# Patient Record
Sex: FEMALE | Race: WHITE | NOT HISPANIC OR LATINO | Employment: OTHER | URBAN - NONMETROPOLITAN AREA
[De-identification: names, ages, dates, MRNs, and addresses within clinical notes are randomized per-mention and may not be internally consistent; named-entity substitution may affect disease eponyms.]

---

## 2021-10-07 ENCOUNTER — OFFICE VISIT (OUTPATIENT)
Dept: CARDIOLOGY | Facility: CLINIC | Age: 81
End: 2021-10-07

## 2021-10-07 VITALS
DIASTOLIC BLOOD PRESSURE: 80 MMHG | WEIGHT: 161 LBS | OXYGEN SATURATION: 98 % | TEMPERATURE: 98.4 F | HEART RATE: 73 BPM | SYSTOLIC BLOOD PRESSURE: 140 MMHG

## 2021-10-07 DIAGNOSIS — I49.1 PREMATURE ATRIAL BEATS: ICD-10-CM

## 2021-10-07 DIAGNOSIS — R03.0 BORDERLINE HYPERTENSION: Primary | ICD-10-CM

## 2021-10-07 PROBLEM — R53.82 CHRONIC FATIGUE: Status: ACTIVE | Noted: 2021-10-07

## 2021-10-07 PROBLEM — R53.82 CHRONIC FATIGUE: Status: RESOLVED | Noted: 2021-10-07 | Resolved: 2021-10-07

## 2021-10-07 PROBLEM — R94.31 ABNORMAL EKG: Status: ACTIVE | Noted: 2021-10-07

## 2021-10-07 PROCEDURE — 93000 ELECTROCARDIOGRAM COMPLETE: CPT | Performed by: INTERNAL MEDICINE

## 2021-10-07 PROCEDURE — 99204 OFFICE O/P NEW MOD 45 MIN: CPT | Performed by: INTERNAL MEDICINE

## 2021-10-07 NOTE — PROGRESS NOTES
Subjective   Chief Complaint   Patient presents with   • dr camacho thinks she has a leaky heart       History of Present Illness  Patient is 80 years old white female who is here for cardiac evaluation.     She does not follow-up with the doctors regularly she has recently saw her PCP for the first time..  Apparently blood pressure was mildly elevated and there was heart murmur.  Patient was sent here for cardiac evaluation.    She states that she is totally asymptomatic.  Denies any chest pain palpitations or shortness of breath.  Daughter states that she is physically very active and is probably in better shape than her    There is no prior history of rheumatic fever or heart murmur.  Patient does not know if she has hyperlipidemia or diabetes mellitus because she has not had any lab work done recently    Past Surgical History:   Procedure Laterality Date   • HERNIA REPAIR     • OVARY SURGERY     • TONSILLECTOMY       Family History   Problem Relation Age of Onset   • Pulmonary embolism Mother      Past Medical History:   Diagnosis Date   • Macular degeneration        Patient Active Problem List   Diagnosis   • Borderline hypertension   • Premature atrial beats   • Abnormal EKG         Social History     Tobacco Use   • Smoking status: Never Smoker   • Smokeless tobacco: Never Used   Substance Use Topics   • Alcohol use: Never   • Drug use: Never         The following portions of the patient's history were reviewed and updated as appropriate: allergies, current medications, past family history, past medical history, past social history, past surgical history and problem list.    No Known Allergies    No current outpatient medications on file.    Review of Systems   Constitutional: Negative.   HENT: Negative.  Negative for congestion.    Eyes: Negative.    Cardiovascular: Negative.  Negative for chest pain, cyanosis, dyspnea on exertion, irregular heartbeat, leg swelling, near-syncope, orthopnea, palpitations,  paroxysmal nocturnal dyspnea and syncope.   Respiratory: Negative.  Negative for shortness of breath.    Hematologic/Lymphatic: Negative.    Musculoskeletal: Negative.    Gastrointestinal: Negative.    Neurological: Negative.  Negative for headaches.        Objective      /80   Pulse 73   Temp 98.4 °F (36.9 °C)   Wt 73 kg (161 lb)   SpO2 98%     Pulmonary:      Effort: Pulmonary effort is normal.      Breath sounds: Normal breath sounds. No stridor. No wheezing. No rhonchi. No rales.   Cardiovascular:      PMI at left midclavicular line. Normal rate. Regular rhythm. Normal S1. Normal S2.      Murmurs: There is no murmur.      No gallop. No click. No rub.   Pulses:     Intact distal pulses.   Edema:     Peripheral edema absent.         Lab Review:    No lab work available    ECG 12 Lead    Date/Time: 10/7/2021 4:22 PM  Performed by: Alma Mejia MD  Authorized by: Alma Mejia MD   Rhythm: sinus rhythm  Ectopy: atrial premature contractions  Rate: normal  BPM: 70  QRS axis: normal  Other findings: non-specific ST-T wave changes, left ventricular hypertrophy and poor R wave progression    Clinical impression: abnormal EKG            I reviewed the patient's new clinical results.  I personally viewed and interpreted the patient's EKG/lab data        Assessment:   Diagnosis Plan   1. Borderline hypertension  Adult Transthoracic Echo Complete W/ Cont if Necessary Per Protocol   2. Premature atrial beats  Adult Transthoracic Echo Complete W/ Cont if Necessary Per Protocol    ECG 12 Lead          Plan:  Patient is 80 years old white female who is here for cardiac evaluation because of hypertension and heart murmur.    Patient is totally asymptomatic and does not seem to be interested in further work-up.  Her blood pressure today is normal  EKG shows occasional PACs left ventricular hypertrophy noted there is also poor R wave progression across the anterior chest leads but no clinical history  of prior myocardial infarction.    Patient does not know her lipid status or blood sugar.  She however feels that she does not have any hyperlipidemia or diabetes.    Further cardiac work-up is indicated  Patient was advised to have echocardiogram done  She also should have lipid panel and CMP checked  Patient is not very interested in work-up  Echocardiogram was scheduled and patient will decide if she wants to go through with that or not  Further work-up such as Holter monitor and stress test should also be considered for better evaluation    She was also advised to have her blood pressure checked closely and if it is elevated then she may need medications.  Patient feels that she has never taken any medications and is not really interested in starting medications at this time.    She was advised to watch her diet lengthy discussion about the diet and salt restriction.  Healthy lifestyle emphasized.  No follow-up was scheduled at this time.    Thank you for giving me the oppertunity to participate in your patient's cardiac care.    Sincerely,    RADHA Mejia M.D. FACP FAC     No follow-ups on file.

## 2024-11-14 ENCOUNTER — TRANSCRIBE ORDERS (OUTPATIENT)
Dept: ADMINISTRATIVE | Facility: HOSPITAL | Age: 84
End: 2024-11-14
Payer: MEDICARE

## 2024-11-14 DIAGNOSIS — N95.9 UNSPECIFIED MENOPAUSAL AND PERIMENOPAUSAL DISORDER: Primary | ICD-10-CM

## 2024-11-14 DIAGNOSIS — R01.1 CARDIAC MURMUR: Primary | ICD-10-CM

## 2024-11-22 ENCOUNTER — HOSPITAL ENCOUNTER (OUTPATIENT)
Dept: CARDIOLOGY | Facility: HOSPITAL | Age: 84
Discharge: HOME | End: 2024-11-22
Payer: MEDICARE

## 2024-11-22 ENCOUNTER — HOSPITAL ENCOUNTER (OUTPATIENT)
Dept: BONE DENSITY | Facility: HOSPITAL | Age: 84
Discharge: HOME | End: 2024-11-22
Payer: MEDICARE

## 2024-11-22 DIAGNOSIS — N95.9 UNSPECIFIED MENOPAUSAL AND PERIMENOPAUSAL DISORDER: ICD-10-CM

## 2024-11-22 DIAGNOSIS — R01.1 CARDIAC MURMUR: ICD-10-CM

## 2024-11-22 LAB
BH CV ECHO MEAS - ACS: 1.1 CM
BH CV ECHO MEAS - AI P1/2T: 828.6 MSEC
BH CV ECHO MEAS - AO MAX PG: 20.8 MMHG
BH CV ECHO MEAS - AO MEAN PG: 12.7 MMHG
BH CV ECHO MEAS - AO ROOT DIAM: 2.7 CM
BH CV ECHO MEAS - AO V2 MAX: 228 CM/SEC
BH CV ECHO MEAS - AO V2 VTI: 60.3 CM
BH CV ECHO MEAS - AVA(I,D): 1.56 CM2
BH CV ECHO MEAS - EDV(CUBED): 195.1 ML
BH CV ECHO MEAS - EDV(MOD-SP2): 70.8 ML
BH CV ECHO MEAS - EDV(MOD-SP4): 76.6 ML
BH CV ECHO MEAS - EF(MOD-BP): 64.1 %
BH CV ECHO MEAS - EF(MOD-SP2): 70.1 %
BH CV ECHO MEAS - EF(MOD-SP4): 58.9 %
BH CV ECHO MEAS - ESV(CUBED): 24.4 ML
BH CV ECHO MEAS - ESV(MOD-SP2): 21.2 ML
BH CV ECHO MEAS - ESV(MOD-SP4): 31.5 ML
BH CV ECHO MEAS - FS: 50 %
BH CV ECHO MEAS - IVS/LVPW: 0.88 CM
BH CV ECHO MEAS - IVSD: 0.7 CM
BH CV ECHO MEAS - LA DIMENSION: 5.2 CM
BH CV ECHO MEAS - LAT PEAK E' VEL: 5.4 CM/SEC
BH CV ECHO MEAS - LV MASS(C)D: 161.9 GRAMS
BH CV ECHO MEAS - LV MAX PG: 4.7 MMHG
BH CV ECHO MEAS - LV MEAN PG: 2 MMHG
BH CV ECHO MEAS - LV V1 MAX: 108 CM/SEC
BH CV ECHO MEAS - LV V1 VTI: 27.1 CM
BH CV ECHO MEAS - LVIDD: 5.8 CM
BH CV ECHO MEAS - LVIDS: 2.9 CM
BH CV ECHO MEAS - LVOT AREA: 3.5 CM2
BH CV ECHO MEAS - LVOT DIAM: 2.1 CM
BH CV ECHO MEAS - LVPWD: 0.8 CM
BH CV ECHO MEAS - MED PEAK E' VEL: 6.6 CM/SEC
BH CV ECHO MEAS - MV A MAX VEL: 115 CM/SEC
BH CV ECHO MEAS - MV DEC SLOPE: 405 CM/SEC2
BH CV ECHO MEAS - MV E MAX VEL: 104 CM/SEC
BH CV ECHO MEAS - MV E/A: 0.9
BH CV ECHO MEAS - MV P1/2T: 91.8 MSEC
BH CV ECHO MEAS - MVA(P1/2T): 2.4 CM2
BH CV ECHO MEAS - RAP SYSTOLE: 10 MMHG
BH CV ECHO MEAS - RVSP: 32.8 MMHG
BH CV ECHO MEAS - SV(LVOT): 93.9 ML
BH CV ECHO MEAS - SV(MOD-SP2): 49.6 ML
BH CV ECHO MEAS - SV(MOD-SP4): 45.1 ML
BH CV ECHO MEAS - TAPSE (>1.6): 2.38 CM
BH CV ECHO MEAS - TR MAX PG: 22.8 MMHG
BH CV ECHO MEAS - TR MAX VEL: 239 CM/SEC
BH CV ECHO MEASUREMENTS AVERAGE E/E' RATIO: 17.33

## 2024-11-22 PROCEDURE — 93306 TTE W/DOPPLER COMPLETE: CPT

## 2024-11-22 PROCEDURE — 77080 DXA BONE DENSITY AXIAL: CPT

## 2025-01-27 ENCOUNTER — OFFICE VISIT (OUTPATIENT)
Dept: CARDIOLOGY | Facility: CLINIC | Age: 85
End: 2025-01-27
Payer: MEDICARE

## 2025-01-27 VITALS
HEART RATE: 75 BPM | SYSTOLIC BLOOD PRESSURE: 155 MMHG | WEIGHT: 147 LBS | BODY MASS INDEX: 27.05 KG/M2 | HEIGHT: 62 IN | DIASTOLIC BLOOD PRESSURE: 85 MMHG

## 2025-01-27 DIAGNOSIS — I34.0 NONRHEUMATIC MITRAL VALVE REGURGITATION: Primary | ICD-10-CM

## 2025-01-27 DIAGNOSIS — I10 ESSENTIAL HYPERTENSION: ICD-10-CM

## 2025-01-27 DIAGNOSIS — I48.19 PERSISTENT ATRIAL FIBRILLATION: ICD-10-CM

## 2025-01-27 DIAGNOSIS — R07.2 PRECORDIAL PAIN: ICD-10-CM

## 2025-01-27 DIAGNOSIS — I35.1 NONRHEUMATIC AORTIC VALVE INSUFFICIENCY: ICD-10-CM

## 2025-01-27 PROCEDURE — 99204 OFFICE O/P NEW MOD 45 MIN: CPT | Performed by: INTERNAL MEDICINE

## 2025-01-27 PROCEDURE — 93000 ELECTROCARDIOGRAM COMPLETE: CPT | Performed by: INTERNAL MEDICINE

## 2025-01-27 RX ORDER — DIPHENOXYLATE HYDROCHLORIDE AND ATROPINE SULFATE 2.5; .025 MG/1; MG/1
TABLET ORAL DAILY
COMMUNITY

## 2025-01-27 RX ORDER — LOSARTAN POTASSIUM 25 MG/1
25 TABLET ORAL DAILY
COMMUNITY

## 2025-01-27 NOTE — PROGRESS NOTES
Janie Reid PA  Suellen Matamoros  1940 01/27/2025    Patient Active Problem List   Diagnosis    Borderline hypertension    Premature atrial beats    Abnormal EKG       Dear  Janie Reid PA-C:    Subjective     Suellen Matamoros is a 84 y.o. female with the problems as listed above, presents    Chief complaint: Recent finding of moderate to severe mitral regurgitation    History of Present Illness: Ms. Suellen Villaseñor is a pleasant 84-year-old  female with a history of hypertension and type 2 diabetes mellitus, was recently noted to have moderate to severe mitral regurgitation and mild aortic incompetence and mild aortic stenosis on the echo Doppler study performed on 11/22/2024.  She is now referred to us for the same.      On further questioning she does complain of some intermittent shortness of breath with moderate exertion.  She denies any PND, orthopnea.  He.  She has some left-sided chest pains that seem to occur at random and resolve spontaneously.  They are of mild intensity.  She does check her blood pressure at home but not sure what it runs at.  She is accompanied by her son today.  Patient's been exposed to secondhand smoking all her life until 2004.    Complete Transthoracic Echocardiogram with Complete Doppler and Color Flow    Accession Number: [id number]   Date of Study: 11/22/24   Ordering Provider: Janie Reid PA   Clinical Indications: Murmur or Click        [facility]  Performing Staff   Cardiology: Manuel Cowart MD    Tech: Dana Harvey           I Show images for Adult Transthoracic Echo Complete W/ Cont if Necessary Per Protocol   Clinical Indication    Murmur or Click   Dx: Cardiac murmur [R01.1 (ICD-10-CM)]     Interpretation Summary     Left ventricular systolic function is normal. Left ventricular ejection fraction appears to be 66 - 70%.    The left ventricular cavity is mildly dilated.    Left ventricular diastolic function is consistent  with (grade Ia w/high LAP) impaired relaxation.    The left atrial cavity is moderately dilated.    The right atrial cavity is mildly  dilated.    Mild aortic valve stenosis is present.    Moderate to severe mitral valve regurgitation is present.    Estimated right ventricular systolic pressure from tricuspid regurgitation is normal (<35 mmHg).    Echogenic consistency of myocardium consistent with possible infiltrative process, consider cardiac amyloidosis.     I looked at the echo myself and do not see any echocardiographic signs of amyloidosis.    No Known Allergies:    Current Outpatient Medications:     Ascorbic Acid (VITAMIN C PO), Take  by mouth., Disp: , Rfl:     Barberry-Oreg Grape-Goldenseal (BERBERINE COMPLEX PO), Take  by mouth., Disp: , Rfl:     Calcium Carb-Cholecalciferol (CALCIUM 500+D PO), Take  by mouth., Disp: , Rfl:     Cholecalciferol (VITAMIN D-3 PO), Take  by mouth., Disp: , Rfl:     losartan (COZAAR) 25 MG tablet, Take 1 tablet by mouth Daily., Disp: , Rfl:     metFORMIN (GLUCOPHAGE) 500 MG tablet, Take 1 tablet by mouth 2 (Two) Times a Day With Meals., Disp: , Rfl:     Multiple Vitamins-Minerals (ICAPS AREDS 2 PO), Take  by mouth., Disp: , Rfl:     Multiple Vitamins-Minerals (ZINC PO), Take  by mouth., Disp: , Rfl:     multivitamin (MULTI VITAMIN PO), Take  by mouth Daily., Disp: , Rfl:     multivitamin with minerals (VISION FORMULA PO), Take 1 tablet by mouth Daily., Disp: , Rfl:     Omega-3 Fatty Acids (OMEGA 3 PO), Take  by mouth., Disp: , Rfl:     apixaban (ELIQUIS) 2.5 MG tablet tablet, Take 1 tablet by mouth 2 (Two) Times a Day., Disp: 60 tablet, Rfl: 2    Past Medical History:   Diagnosis Date    Macular degeneration      Past Surgical History:   Procedure Laterality Date    HERNIA REPAIR      OVARY SURGERY      TONSILLECTOMY       Family History   Problem Relation Age of Onset    Pulmonary embolism Mother      Social History     Tobacco Use    Smoking status: Never    Smokeless  "tobacco: Never   Vaping Use    Vaping status: Never Used   Substance Use Topics    Alcohol use: Never    Drug use: Never     Review of Systems   Constitutional: Negative for chills, fever, malaise/fatigue, weight gain and weight loss.   HENT:  Negative for congestion and nosebleeds.    Cardiovascular:  Negative for chest pain, irregular heartbeat, leg swelling and orthopnea.   Respiratory:  Negative for cough, hemoptysis and shortness of breath.    Endocrine: Positive for polydipsia.   Musculoskeletal:  Positive for back pain, joint pain, joint swelling and stiffness.   Gastrointestinal:  Positive for change in bowel habit. Negative for abdominal pain, hematemesis, melena and vomiting.   Genitourinary:  Positive for bladder incontinence and frequency. Negative for dysuria and hematuria.   Neurological:  Positive for numbness. Negative for focal weakness, headaches, light-headedness and weakness.     Objective   Blood pressure 155/85, pulse 75, height 157.5 cm (62\"), weight 66.7 kg (147 lb).  Body mass index is 26.89 kg/m².    Vitals reviewed.   Constitutional:       Appearance: Well-developed.   Eyes:      Conjunctiva/sclera: Conjunctivae normal.   HENT:      Head: Normocephalic.   Neck:      Thyroid: No thyromegaly.      Vascular: No JVD.      Trachea: No tracheal deviation.   Pulmonary:      Effort: No respiratory distress.      Breath sounds: Normal breath sounds. No wheezing. No rales.   Cardiovascular:      PMI at left midclavicular line. Normal rate. Irregularly irregular rhythm. Normal S1. Normal S2.       Murmurs: There is a grade 3/6 high frequency blowing holosystolic murmur at the apex. There is also a grade 2/6 harsh midsystolic murmur at the URSB, radiating to the neck. There is a grade 2/4 high frequency blowing decrescendo, early diastolic murmur at the URSB, radiating to the apex.      No gallop.  No click. No rub.   Pulses:     Intact distal pulses.   Edema:     Peripheral edema absent.   Abdominal: "      General: Bowel sounds are normal.      Palpations: Abdomen is soft. There is no abdominal mass.      Tenderness: There is no abdominal tenderness.   Musculoskeletal:      Cervical back: Normal range of motion and neck supple. Skin:     General: Skin is warm and dry.   Neurological:      Mental Status: Alert and oriented to person, place, and time.      Cranial Nerves: No cranial nerve deficit.         ECG 12 Lead    Date/Time: 1/27/2025 10:08 AM  Performed by: Sam Asif MD    Authorized by: Sam Asif MD  Comparison: compared with previous ECG from 10/17/2021  Comparison to previous ECG: Is now in atrial fibrillation as compared to the previous EKG.  Rhythm: atrial fibrillation  Conduction: conduction normal  Other findings: non-specific ST-T wave changes          Assessment & Plan    Diagnosis Plan   1. Nonrheumatic mitral valve regurgitation (moderate to severe)  Ambulatory Referral to Cardiothoracic Surgery      2. Precordial pain  ECG 12 Lead    Stress Test With Myocardial Perfusion (1 Day)      3. Persistent atrial fibrillation with a [id number] score of 5        4. Nonrheumatic aortic valve insufficiency (mild)        5. Essential hypertension            Recommendations  Orders Placed This Encounter   Procedures    Ambulatory Referral to Cardiothoracic Surgery    Stress Test With Myocardial Perfusion (1 Day)    ECG 12 Lead        I had an extensive discussion with the Ms. Villaseñor and her son about her echocardiographic findings including the mitral regurgitation and aortic incompetence and a implications for the future with progression of the valvular regurgitations that at some point might require valve replacement.  I have also discussed the importance of having good blood pressure control to hopefully slow the progression of the valvular regurgitations.  I have recommended regular recording of blood pressures at home and taking to the provider's visit to adjust the dose of blood pressure  medications to keep the systolic blood pressure less than 130 or at least less than 140 systolic and less than or equal to 85 diastolic.  Patient and her son expressed understanding.  With regards to mitral regurgitation, I will refer her to cardiothoracic surgery to  give opinion regarding the timing for mitral valve repair/replacement  With regards to atrial fibrillation with a high [id number] score of 5, I have recommended to initiate oral anticoagulation for stroke prevention.  I also discussed about the option of electrical cardioversion to hopefully help with the symptoms of shortness of breath that she is complaining now.  Patient prefers to wait on the cardioversion for now.  We also discussed about evaluating for any underlying myocardial ischemia with a Lexiscan sestamibi study.  Will start her on low-dose Eliquis for now at 2.5 mg p.o. twice daily pending checking a BMP for kidney function and if the kidney function is normal then we will increase the dose to 5 mg twice a day.  I have alerted her to watch for any bleeding especially in the bowels and if she develops any blood in the stools or black stools then she has to stop the blood thinner.  Patient expressed understanding.  Will try to get her last BMP from her PCP as patient states that she had a recent blood test in December 2024.    Return in about 4 weeks (around 2/24/2025).    As always, Janie  I appreciate very much the opportunity to participate in the cardiovascular care of your patients. Please do not hesitate to call me with any questions with regards to Suellen Matamoros's evaluation and management.     With Best Regards,        aSm Asif MD, [facility]    Please note that portions of this note were completed with a voice recognition program.

## 2025-01-27 NOTE — LETTER
January 27, 2025     Janie Reid PA-C  [address] [place] [address]    Patient: Suellen Matamoros   YOB: 1940   Date of Visit: 1/27/2025     Dear Janie Reid PA-C:       Thank you for referring Suellen Matamoros to me for evaluation. Below are the relevant portions of my assessment and plan of care.    If you have questions, please do not hesitate to call me. I look forward to following Suellen along with you.         Sincerely,        Sam Asif MD        CC: Suellen Matamoros    Sam Asif MD  01/27/25 1736  Addendum  Janie Reid PA Sandchuckie Yusufnakul  1940 01/27/2025    Patient Active Problem List   Diagnosis   • Borderline hypertension   • Premature atrial beats   • Abnormal EKG       Dear  Janie Reid PA-C:    Subjective    Suellen Matamoros is a 84 y.o. female with the problems as listed above, presents    Chief complaint: Recent finding of moderate to severe mitral regurgitation    History of Present Illness: Ms. Suellen Villaseñor is a pleasant 84-year-old  female with a history of hypertension and type 2 diabetes mellitus, was recently noted to have moderate to severe mitral regurgitation and mild aortic incompetence and mild aortic stenosis on the echo Doppler study performed on 11/22/2024.  She is now referred to us for the same.      On further questioning she does complain of some intermittent shortness of breath with moderate exertion.  She denies any PND, orthopnea.  He.  She has some left-sided chest pains that seem to occur at random and resolve spontaneously.  They are of mild intensity.  She does check her blood pressure at home but not sure what it runs at.  She is accompanied by her son today.  Patient's been exposed to secondhand smoking all her life until 2004.    Complete Transthoracic Echocardiogram with Complete Doppler and Color Flow    Accession Number: [id number]   Date of Study: 11/22/24   Ordering Provider: Franklin  MARY Bailey   Clinical Indications: Murmur or Click        [facility]  Performing Staff   Cardiology: Manuel Cowart MD    Tech: Dana Harvey           I Show images for Adult Transthoracic Echo Complete W/ Cont if Necessary Per Protocol   Clinical Indication    Murmur or Click   Dx: Cardiac murmur [R01.1 (ICD-10-CM)]     Interpretation Summary   •  Left ventricular systolic function is normal. Left ventricular ejection fraction appears to be 66 - 70%.  •  The left ventricular cavity is mildly dilated.  •  Left ventricular diastolic function is consistent with (grade Ia w/high LAP) impaired relaxation.  •  The left atrial cavity is moderately dilated.  •  The right atrial cavity is mildly  dilated.  •  Mild aortic valve stenosis is present.  •  Moderate to severe mitral valve regurgitation is present.  •  Estimated right ventricular systolic pressure from tricuspid regurgitation is normal (<35 mmHg).  •  Echogenic consistency of myocardium consistent with possible infiltrative process, consider cardiac amyloidosis.     I looked at the echo myself and do not see any echocardiographic signs of amyloidosis.    No Known Allergies:    Current Outpatient Medications:   •  Ascorbic Acid (VITAMIN C PO), Take  by mouth., Disp: , Rfl:   •  Barberry-Oreg Grape-Goldenseal (BERBERINE COMPLEX PO), Take  by mouth., Disp: , Rfl:   •  Calcium Carb-Cholecalciferol (CALCIUM 500+D PO), Take  by mouth., Disp: , Rfl:   •  Cholecalciferol (VITAMIN D-3 PO), Take  by mouth., Disp: , Rfl:   •  losartan (COZAAR) 25 MG tablet, Take 1 tablet by mouth Daily., Disp: , Rfl:   •  metFORMIN (GLUCOPHAGE) 500 MG tablet, Take 1 tablet by mouth 2 (Two) Times a Day With Meals., Disp: , Rfl:   •  Multiple Vitamins-Minerals (ICAPS AREDS 2 PO), Take  by mouth., Disp: , Rfl:   •  Multiple Vitamins-Minerals (ZINC PO), Take  by mouth., Disp: , Rfl:   •  multivitamin (MULTI VITAMIN PO), Take  by mouth Daily., Disp: , Rfl:   •  multivitamin with  "minerals (VISION FORMULA PO), Take 1 tablet by mouth Daily., Disp: , Rfl:   •  Omega-3 Fatty Acids (OMEGA 3 PO), Take  by mouth., Disp: , Rfl:   •  apixaban (ELIQUIS) 2.5 MG tablet tablet, Take 1 tablet by mouth 2 (Two) Times a Day., Disp: 60 tablet, Rfl: 2    Past Medical History:   Diagnosis Date   • Macular degeneration      Past Surgical History:   Procedure Laterality Date   • HERNIA REPAIR     • OVARY SURGERY     • TONSILLECTOMY       Family History   Problem Relation Age of Onset   • Pulmonary embolism Mother      Social History     Tobacco Use   • Smoking status: Never   • Smokeless tobacco: Never   Vaping Use   • Vaping status: Never Used   Substance Use Topics   • Alcohol use: Never   • Drug use: Never     Review of Systems   Constitutional: Negative for chills, fever, malaise/fatigue, weight gain and weight loss.   HENT:  Negative for congestion and nosebleeds.    Cardiovascular:  Negative for chest pain, irregular heartbeat, leg swelling and orthopnea.   Respiratory:  Negative for cough, hemoptysis and shortness of breath.    Endocrine: Positive for polydipsia.   Musculoskeletal:  Positive for back pain, joint pain, joint swelling and stiffness.   Gastrointestinal:  Positive for change in bowel habit. Negative for abdominal pain, hematemesis, melena and vomiting.   Genitourinary:  Positive for bladder incontinence and frequency. Negative for dysuria and hematuria.   Neurological:  Positive for numbness. Negative for focal weakness, headaches, light-headedness and weakness.     Objective  Blood pressure 155/85, pulse 75, height 157.5 cm (62\"), weight 66.7 kg (147 lb).  Body mass index is 26.89 kg/m².    Vitals reviewed.   Constitutional:       Appearance: Well-developed.   Eyes:      Conjunctiva/sclera: Conjunctivae normal.   HENT:      Head: Normocephalic.   Neck:      Thyroid: No thyromegaly.      Vascular: No JVD.      Trachea: No tracheal deviation.   Pulmonary:      Effort: No respiratory distress. "      Breath sounds: Normal breath sounds. No wheezing. No rales.   Cardiovascular:      PMI at left midclavicular line. Normal rate. Irregularly irregular rhythm. Normal S1. Normal S2.       Murmurs: There is a grade 3/6 high frequency blowing holosystolic murmur at the apex. There is also a grade 2/6 harsh midsystolic murmur at the URSB, radiating to the neck. There is a grade 2/4 high frequency blowing decrescendo, early diastolic murmur at the URSB, radiating to the apex.      No gallop.  No click. No rub.   Pulses:     Intact distal pulses.   Edema:     Peripheral edema absent.   Abdominal:      General: Bowel sounds are normal.      Palpations: Abdomen is soft. There is no abdominal mass.      Tenderness: There is no abdominal tenderness.   Musculoskeletal:      Cervical back: Normal range of motion and neck supple. Skin:     General: Skin is warm and dry.   Neurological:      Mental Status: Alert and oriented to person, place, and time.      Cranial Nerves: No cranial nerve deficit.         ECG 12 Lead    Date/Time: 1/27/2025 10:08 AM  Performed by: Sam Asif MD    Authorized by: Sam Asif MD  Comparison: compared with previous ECG from 10/17/2021  Comparison to previous ECG: Is now in atrial fibrillation as compared to the previous EKG.  Rhythm: atrial fibrillation  Conduction: conduction normal  Other findings: non-specific ST-T wave changes          Assessment & Plan   Diagnosis Plan   1. Nonrheumatic mitral valve regurgitation (moderate to severe)  Ambulatory Referral to Cardiothoracic Surgery      2. Precordial pain  ECG 12 Lead    Stress Test With Myocardial Perfusion (1 Day)      3. Persistent atrial fibrillation with a [id number]-VNSm score of 5        4. Nonrheumatic aortic valve insufficiency (mild)        5. Essential hypertension            Recommendations  Orders Placed This Encounter   Procedures   • Ambulatory Referral to Cardiothoracic Surgery   • Stress Test With Myocardial  Perfusion (1 Day)   • ECG 12 Lead        I had an extensive discussion with the Ms. Villaseñor and her son about her echocardiographic findings including the mitral regurgitation and aortic incompetence and a implications for the future with progression of the valvular regurgitations that at some point might require valve replacement.  I have also discussed the importance of having good blood pressure control to hopefully slow the progression of the valvular regurgitations.  I have recommended regular recording of blood pressures at home and taking to the provider's visit to adjust the dose of blood pressure medications to keep the systolic blood pressure less than 130 or at least less than 140 systolic and less than or equal to 85 diastolic.  Patient and her son expressed understanding.  With regards to mitral regurgitation, I will refer her to cardiothoracic surgery to  give opinion regarding the timing for mitral valve repair/replacement  With regards to atrial fibrillation with a high PPQ6RL8-LASn score of 5, I have recommended to initiate oral anticoagulation for stroke prevention.  I also discussed about the option of electrical cardioversion to hopefully help with the symptoms of shortness of breath that she is complaining now.  Patient prefers to wait on the cardioversion for now.  We also discussed about evaluating for any underlying myocardial ischemia with a Lexiscan sestamibi study.  Will start her on low-dose Eliquis for now at 2.5 mg p.o. twice daily pending checking a BMP for kidney function and if the kidney function is normal then we will increase the dose to 5 mg twice a day.  I have alerted her to watch for any bleeding especially in the bowels and if she develops any blood in the stools or black stools then she has to stop the blood thinner.  Patient expressed understanding.  Will try to get her last BMP from her PCP as patient states that she had a recent blood test in December 2024.    Return in  about 4 weeks (around 2/24/2025).    As always, Janie  I appreciate very much the opportunity to participate in the cardiovascular care of your patients. Please do not hesitate to call me with any questions with regards to Suellen Matamoros's evaluation and management.     With Best Regards,        Sam Asif MD, [facility]    Please note that portions of this note were completed with a voice recognition program.

## 2025-02-06 ENCOUNTER — DOCUMENTATION (OUTPATIENT)
Dept: CARDIOLOGY | Facility: HOSPITAL | Age: 85
End: 2025-02-06
Payer: MEDICARE

## 2025-02-06 NOTE — PROGRESS NOTES
Referral received from Dr. Asif for moderate to severe mitral valve regurgitation. Called patient to discuss MR and MitraClip. She is unable to transport from her residence in [place] to [place] Requested that her MR be evaluated by [facility] due to location and her son can assist with transportation.     Called referring MD office to relay information and request that CT surgery consult be sent to [facility] per patient's request.     Mailed MitraClip folder to patient, my contact information is included if I can be of any assistance.

## 2025-02-10 ENCOUNTER — TELEPHONE (OUTPATIENT)
Dept: CARDIOLOGY | Facility: CLINIC | Age: 85
End: 2025-02-10
Payer: MEDICARE

## 2025-02-10 NOTE — TELEPHONE ENCOUNTER
Caller: Suellen Matamoros    Relationship: Self    Best call back number: [phone]    What is the best time to reach you: ANY    Who are you requesting to speak with (clinical staff, provider,  specific staff member): CLINICAL    What was the call regarding: PT IS WANTING TO KNOW IF IT IS OK TO HAVE HER STRESS TEST WITH HER AFIB

## 2025-02-11 NOTE — TELEPHONE ENCOUNTER
Called pt and advised her that  was aware of that when he ordered it and it would be okay to have it done. She expressed understanding.

## 2025-02-21 ENCOUNTER — HOSPITAL ENCOUNTER (OUTPATIENT)
Dept: NUCLEAR MEDICINE | Facility: HOSPITAL | Age: 85
Discharge: HOME | End: 2025-02-21
Payer: MEDICARE

## 2025-02-21 ENCOUNTER — HOSPITAL ENCOUNTER (EMERGENCY)
Facility: HOSPITAL | Age: 85
Discharge: HOME | End: 2025-02-21
Payer: MEDICARE

## 2025-02-21 ENCOUNTER — HOSPITAL ENCOUNTER (OUTPATIENT)
Dept: CARDIOLOGY | Facility: HOSPITAL | Age: 85
Discharge: HOME | End: 2025-02-21
Payer: MEDICARE

## 2025-02-21 ENCOUNTER — APPOINTMENT (OUTPATIENT)
Dept: CT IMAGING | Facility: HOSPITAL | Age: 85
End: 2025-02-21
Payer: MEDICARE

## 2025-02-21 VITALS
SYSTOLIC BLOOD PRESSURE: 155 MMHG | HEART RATE: 117 BPM | RESPIRATION RATE: 14 BRPM | HEIGHT: 62 IN | OXYGEN SATURATION: 96 % | TEMPERATURE: 98.3 F | BODY MASS INDEX: 27.23 KG/M2 | DIASTOLIC BLOOD PRESSURE: 113 MMHG | WEIGHT: 148 LBS

## 2025-02-21 DIAGNOSIS — R07.2 PRECORDIAL PAIN: ICD-10-CM

## 2025-02-21 DIAGNOSIS — S00.212A ABRASION OF LEFT EYEBROW, INITIAL ENCOUNTER: ICD-10-CM

## 2025-02-21 DIAGNOSIS — S00.83XA CONTUSION OF FOREHEAD, INITIAL ENCOUNTER: Primary | ICD-10-CM

## 2025-02-21 PROCEDURE — A9500 TC99M SESTAMIBI: HCPCS | Performed by: INTERNAL MEDICINE

## 2025-02-21 PROCEDURE — 25010000002 REGADENOSON 0.4 MG/5ML SOLUTION: Performed by: INTERNAL MEDICINE

## 2025-02-21 PROCEDURE — 70450 CT HEAD/BRAIN W/O DYE: CPT

## 2025-02-21 PROCEDURE — 34310000005 TECHNETIUM SESTAMIBI: Performed by: INTERNAL MEDICINE

## 2025-02-21 PROCEDURE — 93017 CV STRESS TEST TRACING ONLY: CPT

## 2025-02-21 PROCEDURE — 99284 EMERGENCY DEPT VISIT MOD MDM: CPT

## 2025-02-21 PROCEDURE — 78452 HT MUSCLE IMAGE SPECT MULT: CPT

## 2025-02-21 RX ORDER — REGADENOSON 0.08 MG/ML
0.4 INJECTION, SOLUTION INTRAVENOUS
Status: COMPLETED | OUTPATIENT
Start: 2025-02-21 | End: 2025-02-21

## 2025-02-21 RX ORDER — SODIUM CHLORIDE FOR INHALATION 0.9 %
VIAL, NEBULIZER (ML) INHALATION
Status: DISCONTINUED
Start: 2025-02-21 | End: 2025-02-22 | Stop reason: WASHOUT

## 2025-02-21 RX ADMIN — REGADENOSON 0.4 MG: 0.08 INJECTION, SOLUTION INTRAVENOUS at 11:57

## 2025-02-21 RX ADMIN — TECHNETIUM TC 99M SESTAMIBI 1 DOSE: 1 INJECTION INTRAVENOUS at 12:25

## 2025-02-21 RX ADMIN — TECHNETIUM TC 99M SESTAMIBI 1 DOSE: 1 INJECTION INTRAVENOUS at 10:51

## 2025-02-21 NOTE — ED NOTES
MEDICAL SCREENING:    Reason for Visit: head injury      Patient initially seen in triage.  The patient was advised further evaluation and diagnostic testing will be needed, some of the treatment and testing will be initiated in the lobby in order to begin the process.  The patient will be returned to the waiting area for the time being and possibly be re-assessed by a subsequent ED provider.  The patient will be brought back to the treatment area in as timely manner as possible.       Yulisa Bernal PA  02/21/25 2632

## 2025-02-21 NOTE — ED PROVIDER NOTES
Subjective   History of Present Illness  84-year-old female patient presents to the emergency room today after she took a fall in the parking lot.  Patient states that she tripped over a curb and landed face first.  She did hit her head.  She does take a blood thinner.  There was no loss of consciousness.  Patient has a abrasion to her left eyebrow.  Patient denies any other injuries or concerns.    History provided by:  Patient   used: No        Review of Systems   Constitutional: Negative.    Eyes: Negative.    Respiratory: Negative.     Cardiovascular: Negative.    Gastrointestinal: Negative.    Endocrine: Negative.    Genitourinary: Negative.    Musculoskeletal: Negative.    Skin:  Positive for wound.   Allergic/Immunologic: Negative.    Neurological:  Positive for headaches.   Hematological: Negative.    Psychiatric/Behavioral: Negative.     All other systems reviewed and are negative.      Past Medical History:   Diagnosis Date    Macular degeneration        No Known Allergies    Past Surgical History:   Procedure Laterality Date    HERNIA REPAIR      OVARY SURGERY      TONSILLECTOMY         Family History   Problem Relation Age of Onset    Pulmonary embolism Mother        Social History     Socioeconomic History    Marital status:    Tobacco Use    Smoking status: Never    Smokeless tobacco: Never   Vaping Use    Vaping status: Never Used   Substance and Sexual Activity    Alcohol use: Never    Drug use: Never    Sexual activity: Defer           Objective   Physical Exam  Vitals and nursing note reviewed.   Constitutional:       Appearance: Normal appearance. She is normal weight.   HENT:      Head: Normocephalic.      Comments: Abrasion above left eyebrow.  No bleeding. No closure warranted.  There is bruising and swelling.      Right Ear: External ear normal.      Left Ear: External ear normal.      Nose: Nose normal.      Mouth/Throat:      Mouth: Mucous membranes are moist.       Pharynx: Oropharynx is clear.   Eyes:      Extraocular Movements: Extraocular movements intact.      Conjunctiva/sclera: Conjunctivae normal.      Pupils: Pupils are equal, round, and reactive to light.   Cardiovascular:      Rate and Rhythm: Normal rate and regular rhythm.      Pulses: Normal pulses.      Heart sounds: Normal heart sounds.   Pulmonary:      Effort: Pulmonary effort is normal.      Breath sounds: Normal breath sounds.   Abdominal:      General: Abdomen is flat. Bowel sounds are normal.      Palpations: Abdomen is soft.   Musculoskeletal:         General: Normal range of motion.      Cervical back: Normal range of motion and neck supple.      Comments: Full range of motion of all extremities.  No swelling or deformity.  No other signs of trauma.  She is neurovascularly intact.   Skin:     General: Skin is warm and dry.      Capillary Refill: Capillary refill takes less than 2 seconds.   Neurological:      General: No focal deficit present.      Mental Status: She is alert and oriented to person, place, and time. Mental status is at baseline.      Comments: GCS 15  NIH 0   Psychiatric:         Mood and Affect: Mood normal.         Behavior: Behavior normal.         Thought Content: Thought content normal.         Judgment: Judgment normal.         Procedures           ED Course  ED Course as of 02/21/25 1505   Fri Feb 21, 2025   1456 CT Head Without Contrast [ML]      ED Course User Index  [ML] Yulisa Bernal PA                                      CT Head Without Contrast    Result Date: 2/21/2025  1.  No CT evidence of acute intracranial abnormality. 2.  Minimal left supraorbital region soft tissue swelling.  This report was finalized on 2/21/2025 2:49 PM by Dr. Josiah Traore MD.                      Medical Decision Making  84-year-old female patient presents to the emergency room today after she took a fall in the parking lot.  Patient states that she tripped over a curb and landed face  first.  She did hit her head.  She does take a blood thinner.  There was no loss of consciousness.  Patient has a abrasion to her left eyebrow.  Patient denies any other injuries or concerns.  ED stay has been uncomplicated uneventful.  Imaging is negative for any acute findings.  Patient's wound was cleaned and dressed.  Recommended close follow-up with primary care.  Discussed head injury precautions.    Problems Addressed:  Abrasion of left eyebrow, initial encounter: complicated acute illness or injury  Contusion of forehead, initial encounter: complicated acute illness or injury    Amount and/or Complexity of Data Reviewed  Radiology: ordered. Decision-making details documented in ED Course.        Final diagnoses:   Contusion of forehead, initial encounter   Abrasion of left eyebrow, initial encounter       ED Disposition  ED Disposition       ED Disposition   Discharge    Condition   Stable    Comment   --               Janie Reid PA  [address] [place] [address]  [phone]    Schedule an appointment as soon as possible for a visit in 3 days           Medication List      No changes were made to your prescriptions during this visit.            Yulisa Bernal PA  02/21/25 9690

## 2025-02-24 LAB
BH CV NUCLEAR PRIOR STUDY: 3
BH CV REST NUCLEAR ISOTOPE DOSE: 10.2 MCI
BH CV STRESS BP STAGE 1: NORMAL
BH CV STRESS COMMENTS STAGE 1: NORMAL
BH CV STRESS DOSE REGADENOSON STAGE 1: 0.4
BH CV STRESS DURATION MIN STAGE 1: 0
BH CV STRESS DURATION SEC STAGE 1: 10
BH CV STRESS HR STAGE 1: 151
BH CV STRESS NUCLEAR ISOTOPE DOSE: 29.7 MCI
BH CV STRESS PROTOCOL 1: NORMAL
BH CV STRESS RECOVERY BP: NORMAL MMHG
BH CV STRESS RECOVERY HR: 113 BPM
BH CV STRESS STAGE 1: 1
MAXIMAL PREDICTED HEART RATE: 137 BPM
PERCENT MAX PREDICTED HR: 110.22 %
SPECT HRT GATED+EF W RNC IV: 42 %
STRESS BASELINE BP: NORMAL MMHG
STRESS BASELINE HR: 102 BPM
STRESS PERCENT HR: 130 %
STRESS POST PEAK BP: NORMAL MMHG
STRESS POST PEAK HR: 151 BPM
STRESS TARGET HR: 116 BPM

## 2025-02-25 ENCOUNTER — OFFICE VISIT (OUTPATIENT)
Dept: CARDIOLOGY | Facility: CLINIC | Age: 85
End: 2025-02-25
Payer: MEDICARE

## 2025-02-25 ENCOUNTER — TELEPHONE (OUTPATIENT)
Dept: CARDIOLOGY | Facility: CLINIC | Age: 85
End: 2025-02-25

## 2025-02-25 VITALS
OXYGEN SATURATION: 95 % | HEART RATE: 75 BPM | DIASTOLIC BLOOD PRESSURE: 78 MMHG | SYSTOLIC BLOOD PRESSURE: 122 MMHG | HEIGHT: 62 IN | RESPIRATION RATE: 16 BRPM | WEIGHT: 151.4 LBS | BODY MASS INDEX: 27.86 KG/M2

## 2025-02-25 DIAGNOSIS — I34.0 NONRHEUMATIC MITRAL VALVE REGURGITATION: Primary | ICD-10-CM

## 2025-02-25 DIAGNOSIS — I48.19 PERSISTENT ATRIAL FIBRILLATION: ICD-10-CM

## 2025-02-25 NOTE — PROGRESS NOTES
Janie Reid PA  Suellen Matamoros  1940 02/25/2025    Patient Active Problem List   Diagnosis    Borderline hypertension    Premature atrial beats    Abnormal EKG    Persistent atrial fibrillation with a HIH5EX3-TLZm score of 5       Dear Janie Reid PA:    Subjective     History of Present Illness:    Chief Complaint   Patient presents with    Follow-up     Stress findings    Med Management     verbal       Suellen Matamoros is a pleasant 84 y.o. female with a past medical history significant for essential hypertension, diabetes mellitus, moderate to severe mitral valve regurgitation, mild aortic incompetence.  She comes in today for cardiology follow-up.    Suellen comes in today for follow-up at last visit she was referred to [facility] for consideration of MitraClip however patient preferred to try to go to [facility] for this.  Clinically she still reports significant dyspnea and weakness just doing normal activities a day living she reports inability to even walk from one point in her house to another without having to stop and rest.  She does report some episodes where she feels her heart beating irregular but not continuously.  She denies any overt anginal symptoms such as chest pressure or chest pain.  She has been taking Eliquis but at low-dose of 2.5 mg.    No Known Allergies:      Current Outpatient Medications:     apixaban (ELIQUIS) 5 MG tablet tablet, Take 1 tablet by mouth 2 (Two) Times a Day., Disp: 60 tablet, Rfl: 2    Ascorbic Acid (VITAMIN C PO), Take  by mouth., Disp: , Rfl:     Barberry-Oreg Grape-Goldenseal (BERBERINE COMPLEX PO), Take  by mouth., Disp: , Rfl:     Calcium Carb-Cholecalciferol (CALCIUM 500+D PO), Take  by mouth., Disp: , Rfl:     Cholecalciferol (VITAMIN D-3 PO), Take  by mouth., Disp: , Rfl:     losartan (COZAAR) 25 MG tablet, Take 1 tablet by mouth Daily., Disp: , Rfl:     metFORMIN (GLUCOPHAGE) 500 MG  "tablet, Take 1 tablet by mouth 2 (Two) Times a Day With Meals., Disp: , Rfl:     Multiple Vitamins-Minerals (ICAPS AREDS 2 PO), Take  by mouth., Disp: , Rfl:     Multiple Vitamins-Minerals (ZINC PO), Take  by mouth., Disp: , Rfl:     multivitamin (MULTI VITAMIN PO), Take  by mouth Daily., Disp: , Rfl:     multivitamin with minerals (VISION FORMULA PO), Take 1 tablet by mouth Daily., Disp: , Rfl:     Omega-3 Fatty Acids (OMEGA 3 PO), Take  by mouth., Disp: , Rfl:     The following portions of the patient's history were reviewed and updated as appropriate: allergies, current medications, past family history, past medical history, past social history, past surgical history and problem list.    Social History     Tobacco Use    Smoking status: Never    Smokeless tobacco: Never   Vaping Use    Vaping status: Never Used   Substance Use Topics    Alcohol use: Never    Drug use: Never         Objective   Vitals:    02/25/25 1458   BP: 122/78   Pulse: 75   Resp: 16   SpO2: 95%   Weight: 68.7 kg (151 lb 6.4 oz)   Height: 157.5 cm (62\")     Body mass index is 27.69 kg/m².    ROS    Physical Exam    No results found for: \"NA\", \"K\", \"CL\", \"CO2\", \"BUN\", \"CREATININE\", \"LABGLOM\", \"GLUCOSE\", \"CALCIUM\", \"AST\", \"ALT\", \"ALKPHOS\", \"LABIL2\"  No results found for: \"CKTOTAL\"  No results found for: \"WBC\", \"HGB\", \"HCT\", \"PLT\"  No results found for: \"INR\"  No results found for: \"MG\"  No results found for: \"TSH\", \"PSA\", \"CHLPL\", \"TRIG\", \"HDL\", \"LDL\"   No results found for: \"BNP\"    During this visit the following were done:  Labs Reviewed []    Labs Ordered []    Radiology Reports Reviewed []    Radiology Ordered []    PCP Records Reviewed []    Referring Provider Records Reviewed []    ER Records Reviewed []    Hospital Records Reviewed []    History Obtained From Family []    Radiology Images Reviewed []    Other Reviewed []    Records Requested []       Procedures    Assessment & Plan    Diagnosis Plan   1. Nonrheumatic mitral valve " regurgitation  Ambulatory Referral to Cardiology      2. Persistent atrial fibrillation with a DQH4VT6-HFSd score of 5  Adult Transesophageal Echo (YAREIL) W/ Cont if Necessary Per Protocol    Cardioversion External in Cardiology Department               Recommendations:  New onset/persistent atrial fibrillation  Discussed with patient and her son about proceeding with cardioversion they were now agreeable.  I do think this is reasonable.  Will try to set this up with Dr. Dobbs/Dr. Asif.  I was able to obtain her labs from her primary care provider creatinine 0.67 she was started on low-dose Eliquis 2.5 mg at last visit as we did not have labs to stratify her risk.  Now that we do have her labs she qualifies for 5 mg of Eliquis twice daily as she only meets 1 out of the 3 metrics with 2 being required to be on low-dose.  Discussing this with them I did recommend increasing Eliquis back up to 5 mg.  Since she is underdosed on her Eliquis I do think she will have to have YARIEL prior to cardioversion to rule out thrombus in her left atrial appendage and I did explain this to him she was agreeable.  Moderate to severe mitral valve regurgitation  Patient request referral to [facility] for MitraClip.  Will try to set this up.  Cardiomyopathy  Etiology currently is either valvular disease or ischemic she did recently have stress test that was normal showing normal myocardial perfusion with no evidence of ischemia.  She may still require left heart catheterization prior to proceeding with MitraClip will await her evaluation with surgeon.    Return in about 3 months (around 5/25/2025).    As always, I appreciate very much the opportunity to participate in the cardiovascular care of your patients.      With Best Regards,    Yo Ibanez PA-C

## 2025-02-25 NOTE — TELEPHONE ENCOUNTER
Called and spoke with pcp. They said her most recent labs are on 02/11/25 and they are sending those over now,  ----- Message from Yo Ibanez sent at 2/25/2025  3:42 PM EST -----  Can we get labs from her PCP please?

## 2025-02-25 NOTE — H&P (VIEW-ONLY)
Janie Reid PA  Suellen Matamoros  1940 02/25/2025    Patient Active Problem List   Diagnosis    Borderline hypertension    Premature atrial beats    Abnormal EKG    Persistent atrial fibrillation with a [id number] score of 5       Dear Janie Reid PA:    Subjective     History of Present Illness:    Chief Complaint   Patient presents with    Follow-up     Stress findings    Med Management     verbal       Suellen Matamoros is a pleasant 84 y.o. female with a past medical history significant for essential hypertension, diabetes mellitus, moderate to severe mitral valve regurgitation, mild aortic incompetence.  She comes in today for cardiology follow-up.    Suellen comes in today for follow-up at last visit she was referred to [facility] heart valve clinic for consideration of MitraClip however patient preferred to try to go to [facility] for this.  Clinically she still reports significant dyspnea and weakness just doing normal activities a day living she reports inability to even walk from one point in her house to another without having to stop and rest.  She does report some episodes where she feels her heart beating irregular but not continuously.  She denies any overt anginal symptoms such as chest pressure or chest pain.  She has been taking Eliquis but at low-dose of 2.5 mg.    No Known Allergies:      Current Outpatient Medications:     apixaban (ELIQUIS) 5 MG tablet tablet, Take 1 tablet by mouth 2 (Two) Times a Day., Disp: 60 tablet, Rfl: 2    Ascorbic Acid (VITAMIN C PO), Take  by mouth., Disp: , Rfl:     Barberry-Oreg Grape-Goldenseal (BERBERINE COMPLEX PO), Take  by mouth., Disp: , Rfl:     Calcium Carb-Cholecalciferol (CALCIUM 500+D PO), Take  by mouth., Disp: , Rfl:     Cholecalciferol (VITAMIN D-3 PO), Take  by mouth., Disp: , Rfl:     losartan (COZAAR) 25 MG tablet, Take 1 tablet by mouth Daily., Disp: , Rfl:     metFORMIN (GLUCOPHAGE) 500 MG  "tablet, Take 1 tablet by mouth 2 (Two) Times a Day With Meals., Disp: , Rfl:     Multiple Vitamins-Minerals (ICAPS AREDS 2 PO), Take  by mouth., Disp: , Rfl:     Multiple Vitamins-Minerals (ZINC PO), Take  by mouth., Disp: , Rfl:     multivitamin (MULTI VITAMIN PO), Take  by mouth Daily., Disp: , Rfl:     multivitamin with minerals (VISION FORMULA PO), Take 1 tablet by mouth Daily., Disp: , Rfl:     Omega-3 Fatty Acids (OMEGA 3 PO), Take  by mouth., Disp: , Rfl:     The following portions of the patient's history were reviewed and updated as appropriate: allergies, current medications, past family history, past medical history, past social history, past surgical history and problem list.    Social History     Tobacco Use    Smoking status: Never    Smokeless tobacco: Never   Vaping Use    Vaping status: Never Used   Substance Use Topics    Alcohol use: Never    Drug use: Never         Objective   Vitals:    02/25/25 1458   BP: 122/78   Pulse: 75   Resp: 16   SpO2: 95%   Weight: 68.7 kg (151 lb 6.4 oz)   Height: 157.5 cm (62\")     Body mass index is 27.69 kg/m².    ROS    Physical Exam    No results found for: \"NA\", \"K\", \"CL\", \"CO2\", \"BUN\", \"CREATININE\", \"LABGLOM\", \"GLUCOSE\", \"CALCIUM\", \"AST\", \"ALT\", \"ALKPHOS\", \"LABIL2\"  No results found for: \"CKTOTAL\"  No results found for: \"WBC\", \"HGB\", \"HCT\", \"PLT\"  No results found for: \"INR\"  No results found for: \"MG\"  No results found for: \"TSH\", \"PSA\", \"CHLPL\", \"TRIG\", \"HDL\", \"LDL\"   No results found for: \"BNP\"    During this visit the following were done:  Labs Reviewed []    Labs Ordered []    Radiology Reports Reviewed []    Radiology Ordered []    PCP Records Reviewed []    Referring Provider Records Reviewed []    ER Records Reviewed []    Hospital Records Reviewed []    History Obtained From Family []    Radiology Images Reviewed []    Other Reviewed []    Records Requested []       Procedures    Assessment & Plan    Diagnosis Plan   1. Nonrheumatic mitral valve " regurgitation  Ambulatory Referral to Cardiology      2. Persistent atrial fibrillation with a [id number] score of 5  Adult Transesophageal Echo (YARIEL) W/ Cont if Necessary Per Protocol    Cardioversion External in Cardiology Department               Recommendations:  New onset/persistent atrial fibrillation  Discussed with patient and her son about proceeding with cardioversion they were now agreeable.  I do think this is reasonable.  Will try to set this up with Dr. Dobbs/Dr. Asif.  I was able to obtain her labs from her primary care provider creatinine 0.67 she was started on low-dose Eliquis 2.5 mg at last visit as we did not have labs to stratify her risk.  Now that we do have her labs she qualifies for 5 mg of Eliquis twice daily as she only meets 1 out of the 3 metrics with 2 being required to be on low-dose.  Discussing this with them I did recommend increasing Eliquis back up to 5 mg.  Since she is underdosed on her Eliquis I do think she will have to have YARIEL prior to cardioversion to rule out thrombus in her left atrial appendage and I did explain this to him she was agreeable.  Moderate to severe mitral valve regurgitation  Patient request referral to [facility] for MitraClip.  Will try to set this up.  Cardiomyopathy  Etiology currently is either valvular disease or ischemic she did recently have stress test that was normal showing normal myocardial perfusion with no evidence of ischemia.  She may still require left heart catheterization prior to proceeding with MitraClip will await her evaluation with surgeon.    Return in about 3 months (around 5/25/2025).    As always, I appreciate very much the opportunity to participate in the cardiovascular care of your patients.      With Best Regards,    Yo Ibanez PA-C

## 2025-02-25 NOTE — Clinical Note
Can we call patient and let her know that I was able to speak with a cardiologist out of [facility].  They do have the ability to perform a MitraClip however they are not doing very many of them and the cardiologist I spoke to actually recommended her going to [place] if she is to go south.  If she do still wishes to go to [facility] can still set this up however she would prefer to go to [place] or [facility] the choice is up to her.

## 2025-03-03 ENCOUNTER — TELEPHONE (OUTPATIENT)
Dept: CARDIOLOGY | Facility: CLINIC | Age: 85
End: 2025-03-03
Payer: MEDICARE

## 2025-03-03 NOTE — TELEPHONE ENCOUNTER
----- Message from Yo Ibanez sent at 2/28/2025  1:40 PM EST -----  Can we call patient and let her know that I was able to speak with a cardiologist out of [facility].  They do have the ability to perform a MitraClip however they are not doing very many of them and the cardiologist I spoke to actually recommended her going to [place] if she is to go south.  If she do still wishes to go to [facility] can still set this up however she would prefer to go to [place] or [facility] the choice is up to her.

## 2025-03-04 DIAGNOSIS — I50.32 CHRONIC HEART FAILURE WITH PRESERVED EJECTION FRACTION (HFPEF): Primary | ICD-10-CM

## 2025-03-04 NOTE — TELEPHONE ENCOUNTER
Called pt and she stated she wants to go to [place]. And she was wondering about her cardioversion. I advised her they will call her with that apt. She expressed understanding.

## 2025-03-04 NOTE — TELEPHONE ENCOUNTER
Let her know that I am ordering a chest x-ray, proBNP, and BMP.  The reason we are holding off on cardioversion is 1 to make sure that she is not fluid overloaded and wanted diurese her before proceeding with this.  I am also referring her to the heart failure clinic.

## 2025-03-04 NOTE — TELEPHONE ENCOUNTER
Spoke with Yo yesterday and he wants to hold off on procedure right now. Called and spoke with patient. She said something needs figured out soon because it is very hard to breathe.

## 2025-03-04 NOTE — TELEPHONE ENCOUNTER
Spoke with patient and she has been made aware, she said she will go have these done.   She said she has been having a little bit of swelling from fluid.

## 2025-03-05 ENCOUNTER — DOCUMENTATION (OUTPATIENT)
Dept: CARDIOLOGY | Facility: HOSPITAL | Age: 85
End: 2025-03-05
Payer: MEDICARE

## 2025-03-05 ENCOUNTER — LAB (OUTPATIENT)
Dept: LAB | Facility: HOSPITAL | Age: 85
End: 2025-03-05
Payer: MEDICARE

## 2025-03-05 ENCOUNTER — HOSPITAL ENCOUNTER (OUTPATIENT)
Dept: GENERAL RADIOLOGY | Facility: HOSPITAL | Age: 85
Discharge: HOME | End: 2025-03-05
Payer: MEDICARE

## 2025-03-05 DIAGNOSIS — I50.32 CHRONIC HEART FAILURE WITH PRESERVED EJECTION FRACTION (HFPEF): ICD-10-CM

## 2025-03-05 PROCEDURE — 36415 COLL VENOUS BLD VENIPUNCTURE: CPT

## 2025-03-05 PROCEDURE — 83880 ASSAY OF NATRIURETIC PEPTIDE: CPT

## 2025-03-05 PROCEDURE — 71046 X-RAY EXAM CHEST 2 VIEWS: CPT

## 2025-03-05 PROCEDURE — 80048 BASIC METABOLIC PNL TOTAL CA: CPT

## 2025-03-05 NOTE — PROGRESS NOTES
Referral received for moderate-severe mitral valve regurgitation. Called patient to discuss. Spoke with patient and family, discussed MR and MitraClip. Gave directions to cardiology apt for clinic apt on 3/19. Educational folder mailed today, my contact information is included. Will follow up at or after consult

## 2025-03-06 ENCOUNTER — APPOINTMENT (OUTPATIENT)
Dept: GENERAL RADIOLOGY | Facility: HOSPITAL | Age: 85
DRG: 308 | End: 2025-03-06
Payer: MEDICARE

## 2025-03-06 ENCOUNTER — HOSPITAL ENCOUNTER (INPATIENT)
Facility: HOSPITAL | Age: 85
LOS: 1 days | Discharge: HOME | DRG: 308 | End: 2025-03-07
Admitting: INTERNAL MEDICINE
Payer: MEDICARE

## 2025-03-06 ENCOUNTER — APPOINTMENT (OUTPATIENT)
Dept: CT IMAGING | Facility: HOSPITAL | Age: 85
DRG: 308 | End: 2025-03-06
Payer: MEDICARE

## 2025-03-06 DIAGNOSIS — I48.91 ATRIAL FIBRILLATION WITH RVR: Primary | ICD-10-CM

## 2025-03-06 LAB
A-A DO2: 35.2 MMHG (ref 0–300)
ALBUMIN SERPL-MCNC: 3.9 G/DL (ref 3.5–5.2)
ALBUMIN/GLOB SERPL: 1.6 G/DL
ALP SERPL-CCNC: 87 U/L (ref 39–117)
ALT SERPL W P-5'-P-CCNC: 44 U/L (ref 1–33)
ANION GAP SERPL CALCULATED.3IONS-SCNC: 11 MMOL/L (ref 5–15)
ANION GAP SERPL CALCULATED.3IONS-SCNC: 9.8 MMOL/L (ref 5–15)
ARTERIAL PATENCY WRIST A: POSITIVE
AST SERPL-CCNC: 40 U/L (ref 1–32)
ATMOSPHERIC PRESS: 726 MMHG
B PARAPERT DNA SPEC QL NAA+PROBE: NOT DETECTED
B PERT DNA SPEC QL NAA+PROBE: NOT DETECTED
BACTERIA UR QL AUTO: ABNORMAL /HPF
BASE EXCESS BLDA CALC-SCNC: -0.7 MMOL/L (ref 0–2)
BASOPHILS # BLD AUTO: 0.02 10*3/MM3 (ref 0–0.2)
BASOPHILS NFR BLD AUTO: 0.3 % (ref 0–1.5)
BDY SITE: ABNORMAL
BILIRUB SERPL-MCNC: 0.4 MG/DL (ref 0–1.2)
BILIRUB UR QL STRIP: NEGATIVE
BUN SERPL-MCNC: 22 MG/DL (ref 8–23)
BUN SERPL-MCNC: 23 MG/DL (ref 8–23)
BUN/CREAT SERPL: 28 (ref 7–25)
BUN/CREAT SERPL: 32.4 (ref 7–25)
C PNEUM DNA NPH QL NAA+NON-PROBE: NOT DETECTED
CALCIUM SPEC-SCNC: 9.3 MG/DL (ref 8.6–10.5)
CALCIUM SPEC-SCNC: 9.4 MG/DL (ref 8.6–10.5)
CHLORIDE SERPL-SCNC: 105 MMOL/L (ref 98–107)
CHLORIDE SERPL-SCNC: 108 MMOL/L (ref 98–107)
CLARITY UR: CLEAR
CO2 BLDA-SCNC: 24.4 MMOL/L (ref 22–33)
CO2 SERPL-SCNC: 22.2 MMOL/L (ref 22–29)
CO2 SERPL-SCNC: 25 MMOL/L (ref 22–29)
COHGB MFR BLD: 0.4 % (ref 0–5)
COLOR UR: YELLOW
CREAT SERPL-MCNC: 0.68 MG/DL (ref 0.57–1)
CREAT SERPL-MCNC: 0.82 MG/DL (ref 0.57–1)
D-LACTATE SERPL-SCNC: 1.4 MMOL/L (ref 0.5–2)
DEPRECATED RDW RBC AUTO: 48.7 FL (ref 37–54)
EGFRCR SERPLBLD CKD-EPI 2021: 71.1 ML/MIN/1.73
EGFRCR SERPLBLD CKD-EPI 2021: 86.5 ML/MIN/1.73
EOSINOPHIL # BLD AUTO: 0.05 10*3/MM3 (ref 0–0.4)
EOSINOPHIL NFR BLD AUTO: 0.7 % (ref 0.3–6.2)
ERYTHROCYTE [DISTWIDTH] IN BLOOD BY AUTOMATED COUNT: 14.3 % (ref 12.3–15.4)
FLUAV SUBTYP SPEC NAA+PROBE: NOT DETECTED
FLUBV RNA ISLT QL NAA+PROBE: NOT DETECTED
GEN 5 1HR TROPONIN T REFLEX: 16 NG/L
GLOBULIN UR ELPH-MCNC: 2.4 GM/DL
GLUCOSE SERPL-MCNC: 130 MG/DL (ref 65–99)
GLUCOSE SERPL-MCNC: 138 MG/DL (ref 65–99)
GLUCOSE UR STRIP-MCNC: NEGATIVE MG/DL
HADV DNA SPEC NAA+PROBE: NOT DETECTED
HCO3 BLDA-SCNC: 23.3 MMOL/L (ref 20–26)
HCOV 229E RNA SPEC QL NAA+PROBE: NOT DETECTED
HCOV HKU1 RNA SPEC QL NAA+PROBE: NOT DETECTED
HCOV NL63 RNA SPEC QL NAA+PROBE: NOT DETECTED
HCOV OC43 RNA SPEC QL NAA+PROBE: NOT DETECTED
HCT VFR BLD AUTO: 42.7 % (ref 34–46.6)
HCT VFR BLD CALC: 41 % (ref 38–51)
HGB BLD-MCNC: 13.2 G/DL (ref 12–15.9)
HGB BLDA-MCNC: 13.4 G/DL (ref 13.5–17.5)
HGB UR QL STRIP.AUTO: NEGATIVE
HMPV RNA NPH QL NAA+NON-PROBE: NOT DETECTED
HOLD SPECIMEN: NORMAL
HOLD SPECIMEN: NORMAL
HPIV1 RNA ISLT QL NAA+PROBE: NOT DETECTED
HPIV2 RNA SPEC QL NAA+PROBE: NOT DETECTED
HPIV3 RNA NPH QL NAA+PROBE: NOT DETECTED
HPIV4 P GENE NPH QL NAA+PROBE: NOT DETECTED
HYALINE CASTS UR QL AUTO: ABNORMAL /LPF
IMM GRANULOCYTES # BLD AUTO: 0.02 10*3/MM3 (ref 0–0.05)
IMM GRANULOCYTES NFR BLD AUTO: 0.3 % (ref 0–0.5)
INHALED O2 CONCENTRATION: 21 %
INR PPP: 1.25 (ref 0.9–1.1)
KETONES UR QL STRIP: NEGATIVE
LEUKOCYTE ESTERASE UR QL STRIP.AUTO: ABNORMAL
LYMPHOCYTES # BLD AUTO: 1.66 10*3/MM3 (ref 0.7–3.1)
LYMPHOCYTES NFR BLD AUTO: 23.3 % (ref 19.6–45.3)
Lab: ABNORMAL
M PNEUMO IGG SER IA-ACNC: NOT DETECTED
MAGNESIUM SERPL-MCNC: 1.9 MG/DL (ref 1.6–2.4)
MCH RBC QN AUTO: 28.9 PG (ref 26.6–33)
MCHC RBC AUTO-ENTMCNC: 30.9 G/DL (ref 31.5–35.7)
MCV RBC AUTO: 93.4 FL (ref 79–97)
METHGB BLD QL: 0.4 % (ref 0–3)
MODALITY: ABNORMAL
MONOCYTES # BLD AUTO: 0.6 10*3/MM3 (ref 0.1–0.9)
MONOCYTES NFR BLD AUTO: 8.4 % (ref 5–12)
NEUTROPHILS NFR BLD AUTO: 4.77 10*3/MM3 (ref 1.7–7)
NEUTROPHILS NFR BLD AUTO: 67 % (ref 42.7–76)
NITRITE UR QL STRIP: NEGATIVE
NRBC BLD AUTO-RTO: 0 /100 WBC (ref 0–0.2)
NT-PROBNP SERPL-MCNC: 1063 PG/ML (ref 0–1800)
NT-PROBNP SERPL-MCNC: 1099 PG/ML (ref 0–1800)
OXYHGB MFR BLDV: 91.3 % (ref 94–99)
PCO2 BLDA: 35.6 MM HG (ref 35–45)
PCO2 TEMP ADJ BLD: ABNORMAL MM[HG]
PH BLDA: 7.42 PH UNITS (ref 7.35–7.45)
PH UR STRIP.AUTO: <=5 [PH] (ref 5–8)
PH, TEMP CORRECTED: ABNORMAL
PLATELET # BLD AUTO: 249 10*3/MM3 (ref 140–450)
PMV BLD AUTO: 8.9 FL (ref 6–12)
PO2 BLDA: 67.2 MM HG (ref 83–108)
PO2 TEMP ADJ BLD: ABNORMAL MM[HG]
POTASSIUM SERPL-SCNC: 4.2 MMOL/L (ref 3.5–5.2)
POTASSIUM SERPL-SCNC: 4.7 MMOL/L (ref 3.5–5.2)
PROT SERPL-MCNC: 6.3 G/DL (ref 6–8.5)
PROT UR QL STRIP: ABNORMAL
PROTHROMBIN TIME: 15.9 SECONDS (ref 11.6–15.1)
RBC # BLD AUTO: 4.57 10*6/MM3 (ref 3.77–5.28)
RBC # UR STRIP: ABNORMAL /HPF
REF LAB TEST METHOD: ABNORMAL
RHINOVIRUS RNA SPEC NAA+PROBE: NOT DETECTED
RSV RNA NPH QL NAA+NON-PROBE: NOT DETECTED
SAO2 % BLDCOA: 92 % (ref 94–99)
SARS-COV-2 RNA RESP QL NAA+PROBE: NOT DETECTED
SODIUM SERPL-SCNC: 137 MMOL/L (ref 136–145)
SODIUM SERPL-SCNC: 144 MMOL/L (ref 136–145)
SP GR UR STRIP: >1.03 (ref 1–1.03)
SQUAMOUS #/AREA URNS HPF: ABNORMAL /HPF
TROPONIN T % DELTA: -11
TROPONIN T NUMERIC DELTA: -2 NG/L
TROPONIN T SERPL HS-MCNC: 18 NG/L
UROBILINOGEN UR QL STRIP: ABNORMAL
VENTILATOR MODE: ABNORMAL
WBC # UR STRIP: ABNORMAL /HPF
WBC NRBC COR # BLD AUTO: 7.12 10*3/MM3 (ref 3.4–10.8)
WHOLE BLOOD HOLD COAG: NORMAL
WHOLE BLOOD HOLD SPECIMEN: NORMAL

## 2025-03-06 PROCEDURE — 84484 ASSAY OF TROPONIN QUANT: CPT

## 2025-03-06 PROCEDURE — 82375 ASSAY CARBOXYHB QUANT: CPT

## 2025-03-06 PROCEDURE — 83880 ASSAY OF NATRIURETIC PEPTIDE: CPT

## 2025-03-06 PROCEDURE — 83735 ASSAY OF MAGNESIUM: CPT | Performed by: INTERNAL MEDICINE

## 2025-03-06 PROCEDURE — 71275 CT ANGIOGRAPHY CHEST: CPT

## 2025-03-06 PROCEDURE — 82805 BLOOD GASES W/O2 SATURATION: CPT

## 2025-03-06 PROCEDURE — 36600 WITHDRAWAL OF ARTERIAL BLOOD: CPT

## 2025-03-06 PROCEDURE — 80074 ACUTE HEPATITIS PANEL: CPT

## 2025-03-06 PROCEDURE — 85610 PROTHROMBIN TIME: CPT

## 2025-03-06 PROCEDURE — 83050 HGB METHEMOGLOBIN QUAN: CPT

## 2025-03-06 PROCEDURE — 93005 ELECTROCARDIOGRAM TRACING: CPT

## 2025-03-06 PROCEDURE — 85025 COMPLETE CBC W/AUTO DIFF WBC: CPT

## 2025-03-06 PROCEDURE — 94799 UNLISTED PULMONARY SVC/PX: CPT

## 2025-03-06 PROCEDURE — 0202U NFCT DS 22 TRGT SARS-COV-2: CPT

## 2025-03-06 PROCEDURE — 25010000002 AMPICILLIN-SULBACTAM PER 1.5 G

## 2025-03-06 PROCEDURE — 99223 1ST HOSP IP/OBS HIGH 75: CPT

## 2025-03-06 PROCEDURE — 99285 EMERGENCY DEPT VISIT HI MDM: CPT

## 2025-03-06 PROCEDURE — 71275 CT ANGIOGRAPHY CHEST: CPT | Performed by: RADIOLOGY

## 2025-03-06 PROCEDURE — 87040 BLOOD CULTURE FOR BACTERIA: CPT

## 2025-03-06 PROCEDURE — 25510000001 IOPAMIDOL PER 1 ML

## 2025-03-06 PROCEDURE — 71045 X-RAY EXAM CHEST 1 VIEW: CPT

## 2025-03-06 PROCEDURE — 83735 ASSAY OF MAGNESIUM: CPT

## 2025-03-06 PROCEDURE — 80053 COMPREHEN METABOLIC PANEL: CPT

## 2025-03-06 PROCEDURE — 71045 X-RAY EXAM CHEST 1 VIEW: CPT | Performed by: RADIOLOGY

## 2025-03-06 PROCEDURE — 84443 ASSAY THYROID STIM HORMONE: CPT | Performed by: INTERNAL MEDICINE

## 2025-03-06 PROCEDURE — 36415 COLL VENOUS BLD VENIPUNCTURE: CPT

## 2025-03-06 PROCEDURE — 25810000003 LACTATED RINGERS SOLUTION

## 2025-03-06 PROCEDURE — 84145 PROCALCITONIN (PCT): CPT | Performed by: INTERNAL MEDICINE

## 2025-03-06 PROCEDURE — 83605 ASSAY OF LACTIC ACID: CPT

## 2025-03-06 PROCEDURE — 25010000002 AZITHROMYCIN PER 500 MG

## 2025-03-06 PROCEDURE — 93010 ELECTROCARDIOGRAM REPORT: CPT | Performed by: INTERNAL MEDICINE

## 2025-03-06 PROCEDURE — 87086 URINE CULTURE/COLONY COUNT: CPT | Performed by: STUDENT IN AN ORGANIZED HEALTH CARE EDUCATION/TRAINING PROGRAM

## 2025-03-06 PROCEDURE — 81001 URINALYSIS AUTO W/SCOPE: CPT | Performed by: STUDENT IN AN ORGANIZED HEALTH CARE EDUCATION/TRAINING PROGRAM

## 2025-03-06 RX ORDER — SODIUM CHLORIDE 0.9 % (FLUSH) 0.9 %
10 SYRINGE (ML) INJECTION AS NEEDED
Status: DISCONTINUED | OUTPATIENT
Start: 2025-03-06 | End: 2025-03-07 | Stop reason: HOSPADM

## 2025-03-06 RX ORDER — METOPROLOL TARTRATE 1 MG/ML
5 INJECTION, SOLUTION INTRAVENOUS ONCE
Status: DISCONTINUED | OUTPATIENT
Start: 2025-03-06 | End: 2025-03-06

## 2025-03-06 RX ORDER — IOPAMIDOL 755 MG/ML
100 INJECTION, SOLUTION INTRAVASCULAR
Status: COMPLETED | OUTPATIENT
Start: 2025-03-06 | End: 2025-03-06

## 2025-03-06 RX ADMIN — AZITHROMYCIN MONOHYDRATE 500 MG: 500 INJECTION, POWDER, LYOPHILIZED, FOR SOLUTION INTRAVENOUS at 19:39

## 2025-03-06 RX ADMIN — AMPICILLIN SODIUM, SULBACTAM SODIUM 3 G: 2; 1 INJECTION, POWDER, FOR SOLUTION INTRAMUSCULAR; INTRAVENOUS at 18:24

## 2025-03-06 RX ADMIN — IOPAMIDOL 70 ML: 755 INJECTION, SOLUTION INTRAVENOUS at 18:49

## 2025-03-06 RX ADMIN — SODIUM CHLORIDE 5 MG/HR: 900 INJECTION, SOLUTION INTRAVENOUS at 20:56

## 2025-03-06 RX ADMIN — SODIUM CHLORIDE, POTASSIUM CHLORIDE, SODIUM LACTATE AND CALCIUM CHLORIDE 1000 ML: 600; 310; 30; 20 INJECTION, SOLUTION INTRAVENOUS at 19:39

## 2025-03-06 NOTE — ED PROVIDER NOTES
Subjective   History of Present Illness  Patient is an 84-year-old female with past medical history unremarkable presenting to the emergency department with complaint of shortness of breath.  She was diagnosed with pneumonia via chest x-ray on the fourth of this month.  Has not been on any antibiotics.  Has noticed that she has been progressively more short of breath over the past 1 month.  Dyspnea on exertion but no orthopnea or leg swelling.  She reportedly has heart failure as well.  Not on any diuretics.  Family brought her in because she had an exceptionally high heart rate and worsening dyspnea on exertion.  Denies any other symptoms on ROS.    History provided by:  Patient   used: No        Review of Systems   Constitutional:  Positive for activity change and fatigue. Negative for diaphoresis and fever.   HENT:  Negative for congestion and rhinorrhea.    Eyes:  Negative for pain and redness.   Respiratory:  Positive for shortness of breath. Negative for cough, chest tightness, wheezing and stridor.    Cardiovascular:  Negative for chest pain and palpitations.   Gastrointestinal:  Negative for abdominal pain, constipation, diarrhea, nausea and vomiting.   Genitourinary:  Negative for decreased urine volume, dysuria, flank pain, frequency and hematuria.   Skin:  Negative for color change, pallor, rash and wound.   Neurological:  Negative for tremors, syncope and weakness.       Past Medical History:   Diagnosis Date    Macular degeneration        No Known Allergies    Past Surgical History:   Procedure Laterality Date    HERNIA REPAIR      OVARY SURGERY      TONSILLECTOMY         Family History   Problem Relation Age of Onset    Pulmonary embolism Mother        Social History     Socioeconomic History    Marital status:    Tobacco Use    Smoking status: Never    Smokeless tobacco: Never   Vaping Use    Vaping status: Never Used   Substance and Sexual Activity    Alcohol use: Never     Drug use: Never    Sexual activity: Defer           Objective   Physical Exam  Vitals and nursing note reviewed.   Constitutional:       General: She is not in acute distress.     Appearance: She is well-developed and normal weight. She is not ill-appearing, toxic-appearing or diaphoretic.   HENT:      Head: Normocephalic and atraumatic.      Right Ear: External ear normal.      Left Ear: External ear normal.      Nose: Nose normal. No congestion or rhinorrhea.      Mouth/Throat:      Mouth: Mucous membranes are moist.      Pharynx: No pharyngeal swelling, oropharyngeal exudate or posterior oropharyngeal erythema.   Eyes:      General: No scleral icterus.        Right eye: No discharge.         Left eye: No discharge.      Extraocular Movements: Extraocular movements intact.      Conjunctiva/sclera: Conjunctivae normal.      Pupils: Pupils are equal, round, and reactive to light.   Cardiovascular:      Rate and Rhythm: Tachycardia present. Rhythm irregular.      Pulses: Normal pulses.      Heart sounds: Normal heart sounds. No murmur heard.     No friction rub. No gallop.   Pulmonary:      Effort: Pulmonary effort is normal. No respiratory distress.      Breath sounds: No stridor. Rhonchi present. No wheezing or rales.      Comments: Rhonchi in the right lower lobe  Chest:      Chest wall: No tenderness.   Abdominal:      General: Abdomen is flat. Bowel sounds are normal. There is no distension.      Palpations: Abdomen is soft.      Tenderness: There is no abdominal tenderness. There is no guarding or rebound.   Musculoskeletal:         General: No swelling, tenderness, deformity or signs of injury. Normal range of motion.      Cervical back: Normal range of motion and neck supple. No tenderness.      Right lower leg: No edema.      Left lower leg: No edema.   Skin:     General: Skin is warm.      Capillary Refill: Capillary refill takes less than 2 seconds.      Coloration: Skin is not jaundiced or pale.       Findings: No bruising, erythema, lesion or rash.   Neurological:      General: No focal deficit present.      Mental Status: She is alert and oriented to person, place, and time. Mental status is at baseline.      Cranial Nerves: No cranial nerve deficit.      Sensory: No sensory deficit.      Motor: No weakness.      Coordination: Coordination normal.         Procedures       Results for orders placed or performed during the hospital encounter of 03/06/25   ECG 12 Lead Dyspnea    Collection Time: 03/06/25  5:08 PM   Result Value Ref Range    QT Interval 300 ms    QTC Interval 471 ms   Hepatitis Panel, Acute    Collection Time: 03/06/25  5:17 PM    Specimen: Blood   Result Value Ref Range    Hepatitis B Surface Ag Non-Reactive Non-Reactive    Hep A IgM Non-Reactive Non-Reactive    Hep B C IgM Non-Reactive Non-Reactive    Hepatitis C Ab Non-Reactive Non-Reactive   Blood Gas, Arterial With Co-Ox    Collection Time: 03/06/25  5:44 PM    Specimen: Arterial Blood   Result Value Ref Range    Site Right Radial     Indio's Test Positive     pH, Arterial 7.425 7.350 - 7.450 pH units    pCO2, Arterial 35.6 35.0 - 45.0 mm Hg    pO2, Arterial 67.2 (L) 83.0 - 108.0 mm Hg    HCO3, Arterial 23.3 20.0 - 26.0 mmol/L    Base Excess, Arterial -0.7 (L) 0.0 - 2.0 mmol/L    O2 Saturation, Arterial 92.0 (L) 94.0 - 99.0 %    Hemoglobin, Blood Gas 13.4 (L) 13.5 - 17.5 g/dL    Hematocrit, Blood Gas 41.0 38.0 - 51.0 %    Oxyhemoglobin 91.3 (L) 94 - 99 %    Methemoglobin 0.40 0.00 - 3.00 %    Carboxyhemoglobin 0.4 0 - 5 %    A-a DO2 35.2 0.0 - 300.0 mmHg    CO2 Content 24.4 22 - 33 mmol/L    Barometric Pressure for Blood Gas 726 mmHg    Modality Room Air     FIO2 21 %    Ventilator Mode NA     Collected by [id number]     pH, Temp Corrected      pCO2, Temperature Corrected      pO2, Temperature Corrected     Blood Culture - Blood, Arm, Right    Collection Time: 03/06/25  5:57 PM    Specimen: Arm, Right; Blood   Result Value Ref Range    Blood  Culture No growth at 24 hours    Blood Culture - Blood, Arm, Left    Collection Time: 03/06/25  5:57 PM    Specimen: Arm, Left; Blood   Result Value Ref Range    Blood Culture No growth at 24 hours    Comprehensive Metabolic Panel    Collection Time: 03/06/25  5:57 PM    Specimen: Blood   Result Value Ref Range    Glucose 130 (H) 65 - 99 mg/dL    BUN 23 8 - 23 mg/dL    Creatinine 0.82 0.57 - 1.00 mg/dL    Sodium 144 136 - 145 mmol/L    Potassium 4.7 3.5 - 5.2 mmol/L    Chloride 108 (H) 98 - 107 mmol/L    CO2 25.0 22.0 - 29.0 mmol/L    Calcium 9.3 8.6 - 10.5 mg/dL    Total Protein 6.3 6.0 - 8.5 g/dL    Albumin 3.9 3.5 - 5.2 g/dL    ALT (SGPT) 44 (H) 1 - 33 U/L    AST (SGOT) 40 (H) 1 - 32 U/L    Alkaline Phosphatase 87 39 - 117 U/L    Total Bilirubin 0.4 0.0 - 1.2 mg/dL    Globulin 2.4 gm/dL    A/G Ratio 1.6 g/dL    BUN/Creatinine Ratio 28.0 (H) 7.0 - 25.0    Anion Gap 11.0 5.0 - 15.0 mmol/L    eGFR 71.1 >60.0 mL/min/1.73   Protime-INR    Collection Time: 03/06/25  5:57 PM    Specimen: Blood   Result Value Ref Range    Protime 15.9 (H) 11.6 - 15.1 Seconds    INR 1.25 (H) 0.90 - 1.10   BNP    Collection Time: 03/06/25  5:57 PM    Specimen: Blood   Result Value Ref Range    proBNP 1,099.0 0.0 - 1,800.0 pg/mL   High Sensitivity Troponin T    Collection Time: 03/06/25  5:57 PM    Specimen: Blood   Result Value Ref Range    HS Troponin T 18 (H) <14 ng/L   Lactic Acid, Plasma    Collection Time: 03/06/25  5:57 PM    Specimen: Blood   Result Value Ref Range    Lactate 1.4 0.5 - 2.0 mmol/L   Magnesium    Collection Time: 03/06/25  5:57 PM    Specimen: Blood   Result Value Ref Range    Magnesium 1.9 1.6 - 2.4 mg/dL   CBC Auto Differential    Collection Time: 03/06/25  5:57 PM    Specimen: Blood   Result Value Ref Range    WBC 7.12 3.40 - 10.80 10*3/mm3    RBC 4.57 3.77 - 5.28 10*6/mm3    Hemoglobin 13.2 12.0 - 15.9 g/dL    Hematocrit 42.7 34.0 - 46.6 %    MCV 93.4 79.0 - 97.0 fL    MCH 28.9 26.6 - 33.0 pg    MCHC 30.9 (L) 31.5  - 35.7 g/dL    RDW 14.3 12.3 - 15.4 %    RDW-SD 48.7 37.0 - 54.0 fl    MPV 8.9 6.0 - 12.0 fL    Platelets 249 140 - 450 10*3/mm3    Neutrophil % 67.0 42.7 - 76.0 %    Lymphocyte % 23.3 19.6 - 45.3 %    Monocyte % 8.4 5.0 - 12.0 %    Eosinophil % 0.7 0.3 - 6.2 %    Basophil % 0.3 0.0 - 1.5 %    Immature Grans % 0.3 0.0 - 0.5 %    Neutrophils, Absolute 4.77 1.70 - 7.00 10*3/mm3    Lymphocytes, Absolute 1.66 0.70 - 3.10 10*3/mm3    Monocytes, Absolute 0.60 0.10 - 0.90 10*3/mm3    Eosinophils, Absolute 0.05 0.00 - 0.40 10*3/mm3    Basophils, Absolute 0.02 0.00 - 0.20 10*3/mm3    Immature Grans, Absolute 0.02 0.00 - 0.05 10*3/mm3    nRBC 0.0 0.0 - 0.2 /100 WBC   Procalcitonin    Collection Time: 03/06/25  5:57 PM    Specimen: Blood   Result Value Ref Range    Procalcitonin 0.03 0.00 - 0.25 ng/mL   Green Top (Gel)    Collection Time: 03/06/25  5:57 PM   Result Value Ref Range    Extra Tube Hold for add-ons.    Lavender Top    Collection Time: 03/06/25  5:57 PM   Result Value Ref Range    Extra Tube hold for add-on    Gold Top - SST    Collection Time: 03/06/25  5:57 PM   Result Value Ref Range    Extra Tube Hold for add-ons.    Light Blue Top    Collection Time: 03/06/25  5:57 PM   Result Value Ref Range    Extra Tube Hold for add-ons.    Respiratory Panel PCR w/COVID-19(SARS-CoV-2) RICARDO/ELLIS/DOMINIQUE/PAD/COR/REUBEN In-House, NP Swab in UTM/VTM, 2 HR TAT - Swab, Nasopharynx    Collection Time: 03/06/25  6:03 PM    Specimen: Nasopharynx; Swab   Result Value Ref Range    ADENOVIRUS, PCR Not Detected Not Detected    Coronavirus 229E Not Detected Not Detected    Coronavirus HKU1 Not Detected Not Detected    Coronavirus NL63 Not Detected Not Detected    Coronavirus OC43 Not Detected Not Detected    COVID19 Not Detected Not Detected - Ref. Range    Human Metapneumovirus Not Detected Not Detected    Human Rhinovirus/Enterovirus Not Detected Not Detected    Influenza A PCR Not Detected Not Detected    Influenza B PCR Not Detected Not  Detected    Parainfluenza Virus 1 Not Detected Not Detected    Parainfluenza Virus 2 Not Detected Not Detected    Parainfluenza Virus 3 Not Detected Not Detected    Parainfluenza Virus 4 Not Detected Not Detected    RSV, PCR Not Detected Not Detected    Bordetella pertussis pcr Not Detected Not Detected    Bordetella parapertussis PCR Not Detected Not Detected    Chlamydophila pneumoniae PCR Not Detected Not Detected    Mycoplasma pneumo by PCR Not Detected Not Detected   High Sensitivity Troponin T 1Hr    Collection Time: 03/06/25  6:50 PM    Specimen: Arm, Left; Blood   Result Value Ref Range    HS Troponin T 16 (H) <14 ng/L    Troponin T Numeric Delta -2 ng/L    Troponin T % Delta -11 Abnormal if >/= 20%   Magnesium    Collection Time: 03/06/25  6:50 PM    Specimen: Arm, Left; Blood   Result Value Ref Range    Magnesium 2.0 1.6 - 2.4 mg/dL   TSH    Collection Time: 03/06/25  6:50 PM    Specimen: Arm, Left; Blood   Result Value Ref Range    TSH 1.230 0.270 - 4.200 uIU/mL   Urinalysis With Culture If Indicated - Urine, Clean Catch    Collection Time: 03/06/25  8:11 PM    Specimen: Urine, Clean Catch   Result Value Ref Range    Color, UA Yellow Yellow, Straw    Appearance, UA Clear Clear    pH, UA <=5.0 5.0 - 8.0    Specific Gravity, UA >1.030 (H) 1.005 - 1.030    Glucose, UA Negative Negative    Ketones, UA Negative Negative    Bilirubin, UA Negative Negative    Blood, UA Negative Negative    Protein, UA Trace (A) Negative    Leuk Esterase, UA Small (1+) (A) Negative    Nitrite, UA Negative Negative    Urobilinogen, UA 0.2 E.U./dL 0.2 - 1.0 E.U./dL   Urinalysis, Microscopic Only - Urine, Clean Catch    Collection Time: 03/06/25  8:11 PM    Specimen: Urine, Clean Catch   Result Value Ref Range    RBC, UA 0-2 None Seen, 0-2 /HPF    WBC, UA 21-50 (A) None Seen, 0-2 /HPF    Bacteria, UA None Seen None Seen /HPF    Squamous Epithelial Cells, UA 0-2 None Seen, 0-2 /HPF    Hyaline Casts, UA None Seen None Seen /LPF     Methodology Manual Light Microscopy    POC Glucose Once    Collection Time: 03/07/25  1:00 AM    Specimen: Blood   Result Value Ref Range    Glucose 120 70 - 130 mg/dL   ECG 12 Lead Tachycardia    Collection Time: 03/07/25  1:21 AM   Result Value Ref Range    QT Interval 328 ms    QTC Interval 385 ms   Comprehensive Metabolic Panel    Collection Time: 03/07/25  2:44 AM    Specimen: Blood   Result Value Ref Range    Glucose 119 (H) 65 - 99 mg/dL    BUN 21 8 - 23 mg/dL    Creatinine 0.68 0.57 - 1.00 mg/dL    Sodium 140 136 - 145 mmol/L    Potassium 4.4 3.5 - 5.2 mmol/L    Chloride 107 98 - 107 mmol/L    CO2 24.0 22.0 - 29.0 mmol/L    Calcium 8.6 8.6 - 10.5 mg/dL    Total Protein 5.5 (L) 6.0 - 8.5 g/dL    Albumin 3.6 3.5 - 5.2 g/dL    ALT (SGPT) 42 (H) 1 - 33 U/L    AST (SGOT) 36 (H) 1 - 32 U/L    Alkaline Phosphatase 76 39 - 117 U/L    Total Bilirubin 0.6 0.0 - 1.2 mg/dL    Globulin 1.9 gm/dL    A/G Ratio 1.9 g/dL    BUN/Creatinine Ratio 30.9 (H) 7.0 - 25.0    Anion Gap 9.0 5.0 - 15.0 mmol/L    eGFR 86.5 >60.0 mL/min/1.73   CBC Auto Differential    Collection Time: 03/07/25  2:44 AM    Specimen: Blood   Result Value Ref Range    WBC 7.17 3.40 - 10.80 10*3/mm3    RBC 4.14 3.77 - 5.28 10*6/mm3    Hemoglobin 12.0 12.0 - 15.9 g/dL    Hematocrit 38.7 34.0 - 46.6 %    MCV 93.5 79.0 - 97.0 fL    MCH 29.0 26.6 - 33.0 pg    MCHC 31.0 (L) 31.5 - 35.7 g/dL    RDW 14.3 12.3 - 15.4 %    RDW-SD 48.9 37.0 - 54.0 fl    MPV 9.0 6.0 - 12.0 fL    Platelets 200 140 - 450 10*3/mm3    Neutrophil % 60.3 42.7 - 76.0 %    Lymphocyte % 29.0 19.6 - 45.3 %    Monocyte % 9.2 5.0 - 12.0 %    Eosinophil % 1.0 0.3 - 6.2 %    Basophil % 0.4 0.0 - 1.5 %    Immature Grans % 0.1 0.0 - 0.5 %    Neutrophils, Absolute 4.32 1.70 - 7.00 10*3/mm3    Lymphocytes, Absolute 2.08 0.70 - 3.10 10*3/mm3    Monocytes, Absolute 0.66 0.10 - 0.90 10*3/mm3    Eosinophils, Absolute 0.07 0.00 - 0.40 10*3/mm3    Basophils, Absolute 0.03 0.00 - 0.20 10*3/mm3    Immature  Grans, Absolute 0.01 0.00 - 0.05 10*3/mm3    nRBC 0.0 0.0 - 0.2 /100 WBC   Hemoglobin A1c    Collection Time: 03/07/25  2:44 AM    Specimen: Blood   Result Value Ref Range    Hemoglobin A1C 6.80 (H) 4.80 - 5.60 %   ECG 12 Lead Tachycardia    Collection Time: 03/07/25  6:52 AM   Result Value Ref Range    QT Interval 408 ms    QTC Interval 440 ms   POC Glucose Once    Collection Time: 03/07/25  7:05 AM    Specimen: Blood   Result Value Ref Range    Glucose 100 70 - 130 mg/dL   POC Glucose Once    Collection Time: 03/07/25 11:31 AM    Specimen: Blood   Result Value Ref Range    Glucose 94 70 - 130 mg/dL   ReDs Vest    Collection Time: 03/07/25  4:54 PM   Result Value Ref Range    Absolute Lung Fluid Content 38 (A) 20 - 35 %         ED Course  ED Course as of 03/08/25 0022   Thu Mar 06, 2025   1734 BP: 117/81 [KEVIN]   1734 Temp: 98 °F (36.7 °C) [KEVIN]   1734 Heart Rate(!): 150 [KEVIN]   1734 SpO2: 99 % [KEVIN]   1734 Resp: 20 [KEVIN]   1753 pH, Arterial: 7.425 [KEVIN]   1753 pCO2, Arterial: 35.6 [KEVIN]   1753 pO2, Arterial(!): 67.2 [KEVIN]   1753 HCO3, Arterial: 23.3 [KEVIN]   1810 ECG 12 Lead Dyspnea  Personal EKG interpretation:  Heart rate 148, A-fib with RVR, right axis, normal intervals otherwise, no ST elevation, ST depression, T wave inversion, no STEMI  Electronically signed by Renzo Pina MD, 03/06/25, 6:10 PM EST.   [KEVIN]   1811 Patient has a pneumonia but has A-fib with RVR.  I will not give rate controlling medications at this time seeing as she is likely compensating for her possible sepsis and pneumonia.  Do not want a tank or pressure.  Will continue to monitor.  She will be receiving fluids as well as antibiotics. [KEVIN]   1827 HS Troponin T(!): 18 [KEVIN]   1828 WBC: 7.12 [KEVIN]   1828 Hemoglobin: 13.2 [KEVIN]   1828 Comprehensive Metabolic Panel(!)  Overall unremarkable on personal interpretation [KEVIN]   1907 S/o Dr Sanchez [KEVIN]   2125 Patient care was taken from Dr. Pina at shift change.  Patient has a history of atrial fibrillation  but review of medical record shows that she on anticoagulation but no evidence of rate control or rhythm control medicine at this time.  Started Cardizem infusion at 5 MG/HR W/ HR rate slowly improving currently 121-130 down from 150.    Will be admitted to hospital service.  Electronically signed by Christina Sanchez DO, 03/06/25, 9:26 PM EST.   [LK]   2128 IV fluids were discontinued at roughly 800 mL out of the thousand after reviewing CT chest showing a large right-sided pleural effusion with diffuse interstitial pulmonary edema.  Electronically signed by Christina Sanchez DO, 03/06/25, 9:30 PM EST.   [LK]   2248 Patient has been submitted for request for admission to the hospital service.  At this time pending return call for hospitalization and admission.  Electronically signed by Christina Sanchez DO, 03/06/25, 10:49 PM EST.   [LK]      ED Course User Index  [KEVIN] Renzo Pina MD  [LK] Christina Sanchez DO              [id number] Score: 6                                          Medical Decision Making  MDM:  Patient is an 84-year-old female with past medical history as above presenting the emergency department with complaint of shortness of breath.  She was diagnosed with pneumonia 1 to 2 days ago.  No antibiotics.  Will start antibiotics for sepsis in the meantime with CAP coverage.  Will give bolus of fluids in the ER.  Had a normal EF as of 2024 so we will give a liter and then reassess.  Will place fluid exclusion order.  She does appear to be in A-fib with RVR but she is already anticoagulated and this is likely compensatory seeing as she has pneumonia.  Personal interpretation of chest x-ray that was done today does appear to show right lower lobe pneumonia.  Will obtain labs to rule out anemia, metabolic abnormalities.  Lower concern for liver or biliary or bili pathology calling her symptoms today.  Could be heart failure pending BNP.  She is not on any diuretics.  Low concern for pulmonary embolism seeing as  "patient is already on Eliquis and has no clinical signs of DVT.  No hypoxia.  Less likely aortic dissection without any ripping back pain or widened mediastinum.  Less likely pneumothorax with bilateral breath sounds.  Anticipate admission.    -------------------------------------               03/06/25 03/06/25                  1724         1741     -------------------------------------   BP:          117/81                  Pulse:      (!) 150                  Resp:          20                    Temp:   98 °F (36.7 °C)              TempSrc:      Oral                   SpO2:         99%          92%       Weight: 68.5 kg (151 lb)             Height:  157.5 cm (62\")             -------------------------------------      Renzo Pina MD  Emergency Medicine      Problems Addressed:  Atrial fibrillation with RVR: complicated acute illness or injury    Amount and/or Complexity of Data Reviewed  Labs: ordered. Decision-making details documented in ED Course.  Radiology: ordered.  ECG/medicine tests: ordered. Decision-making details documented in ED Course.    Risk  Prescription drug management.  Decision regarding hospitalization.        Final diagnoses:   Atrial fibrillation with RVR       ED Disposition  ED Disposition       ED Disposition   Decision to Admit    Condition   --    Comment   Level of Care: Telemetry [5]   Diagnosis: Atrial fibrillation with RVR [[id number]]   Admitting Physician: PIA ZELAYA [[id number]]   Certification: I Certify That Inpatient Hospital Services Are Medically Necessary For Greater Than 2 Midnights                 Janie Reid PA  [address] [place] [id number]  [phone]    Follow up in 1 week(s)  Will notify patient about follow-up appt. with Janie Reid 03/10/25.    Janie Reid PA  [address] [place] [id number]  [phone]               Medication List        New Prescriptions      furosemide 40 MG " tablet  Commonly known as: Lasix  Take 1 tablet by mouth Daily.     metoprolol succinate XL 25 MG 24 hr tablet  Commonly known as: TOPROL-XL  Take 1 tablet by mouth Every 12 (Twelve) Hours.               Where to Get Your Medications        These medications were sent to [facility] [address] [place] [address]      Hours: Monday to Friday 7 AM to 6 PM Phone: [phone]   furosemide 40 MG tablet  metoprolol succinate XL 25 MG 24 hr tablet            Christina Sanchez DO  03/08/25 0022

## 2025-03-07 ENCOUNTER — HOSPITAL ENCOUNTER (INPATIENT)
Dept: CARDIOLOGY | Facility: HOSPITAL | Age: 85
Discharge: HOME | End: 2025-03-07
Payer: MEDICARE

## 2025-03-07 ENCOUNTER — HOSPITAL ENCOUNTER (OUTPATIENT)
Dept: CARDIOLOGY | Facility: HOSPITAL | Age: 85
Setting detail: HOSPITAL OUTPATIENT SURGERY
Discharge: HOME | DRG: 308 | End: 2025-03-07
Payer: MEDICARE

## 2025-03-07 VITALS
RESPIRATION RATE: 16 BRPM | HEART RATE: 82 BPM | SYSTOLIC BLOOD PRESSURE: 103 MMHG | DIASTOLIC BLOOD PRESSURE: 66 MMHG | OXYGEN SATURATION: 96 % | HEIGHT: 62 IN | TEMPERATURE: 98.8 F | WEIGHT: 171.8 LBS | BODY MASS INDEX: 31.62 KG/M2

## 2025-03-07 PROBLEM — I48.91 ATRIAL FIBRILLATION WITH RVR: Status: RESOLVED | Noted: 2025-03-06 | Resolved: 2025-03-07

## 2025-03-07 LAB
ABSOLUTE LUNG FLUID CONTENT: 38 % (ref 20–35)
ALBUMIN SERPL-MCNC: 3.6 G/DL (ref 3.5–5.2)
ALBUMIN/GLOB SERPL: 1.9 G/DL
ALP SERPL-CCNC: 76 U/L (ref 39–117)
ALT SERPL W P-5'-P-CCNC: 42 U/L (ref 1–33)
ANION GAP SERPL CALCULATED.3IONS-SCNC: 9 MMOL/L (ref 5–15)
AST SERPL-CCNC: 36 U/L (ref 1–32)
BASOPHILS # BLD AUTO: 0.03 10*3/MM3 (ref 0–0.2)
BASOPHILS NFR BLD AUTO: 0.4 % (ref 0–1.5)
BILIRUB SERPL-MCNC: 0.6 MG/DL (ref 0–1.2)
BUN SERPL-MCNC: 21 MG/DL (ref 8–23)
BUN/CREAT SERPL: 30.9 (ref 7–25)
CALCIUM SPEC-SCNC: 8.6 MG/DL (ref 8.6–10.5)
CHLORIDE SERPL-SCNC: 107 MMOL/L (ref 98–107)
CO2 SERPL-SCNC: 24 MMOL/L (ref 22–29)
CREAT SERPL-MCNC: 0.68 MG/DL (ref 0.57–1)
DEPRECATED RDW RBC AUTO: 48.9 FL (ref 37–54)
EGFRCR SERPLBLD CKD-EPI 2021: 86.5 ML/MIN/1.73
EOSINOPHIL # BLD AUTO: 0.07 10*3/MM3 (ref 0–0.4)
EOSINOPHIL NFR BLD AUTO: 1 % (ref 0.3–6.2)
ERYTHROCYTE [DISTWIDTH] IN BLOOD BY AUTOMATED COUNT: 14.3 % (ref 12.3–15.4)
GLOBULIN UR ELPH-MCNC: 1.9 GM/DL
GLUCOSE BLDC GLUCOMTR-MCNC: 100 MG/DL (ref 70–130)
GLUCOSE BLDC GLUCOMTR-MCNC: 120 MG/DL (ref 70–130)
GLUCOSE BLDC GLUCOMTR-MCNC: 94 MG/DL (ref 70–130)
GLUCOSE SERPL-MCNC: 119 MG/DL (ref 65–99)
HAV IGM SERPL QL IA: NORMAL
HBA1C MFR BLD: 6.8 % (ref 4.8–5.6)
HBV CORE IGM SERPL QL IA: NORMAL
HBV SURFACE AG SERPL QL IA: NORMAL
HCT VFR BLD AUTO: 38.7 % (ref 34–46.6)
HCV AB SER QL: NORMAL
HGB BLD-MCNC: 12 G/DL (ref 12–15.9)
IMM GRANULOCYTES # BLD AUTO: 0.01 10*3/MM3 (ref 0–0.05)
IMM GRANULOCYTES NFR BLD AUTO: 0.1 % (ref 0–0.5)
LYMPHOCYTES # BLD AUTO: 2.08 10*3/MM3 (ref 0.7–3.1)
LYMPHOCYTES NFR BLD AUTO: 29 % (ref 19.6–45.3)
MAGNESIUM SERPL-MCNC: 2 MG/DL (ref 1.6–2.4)
MCH RBC QN AUTO: 29 PG (ref 26.6–33)
MCHC RBC AUTO-ENTMCNC: 31 G/DL (ref 31.5–35.7)
MCV RBC AUTO: 93.5 FL (ref 79–97)
MONOCYTES # BLD AUTO: 0.66 10*3/MM3 (ref 0.1–0.9)
MONOCYTES NFR BLD AUTO: 9.2 % (ref 5–12)
NEUTROPHILS NFR BLD AUTO: 4.32 10*3/MM3 (ref 1.7–7)
NEUTROPHILS NFR BLD AUTO: 60.3 % (ref 42.7–76)
NRBC BLD AUTO-RTO: 0 /100 WBC (ref 0–0.2)
PLATELET # BLD AUTO: 200 10*3/MM3 (ref 140–450)
PMV BLD AUTO: 9 FL (ref 6–12)
POTASSIUM SERPL-SCNC: 4.4 MMOL/L (ref 3.5–5.2)
PROCALCITONIN SERPL-MCNC: 0.03 NG/ML (ref 0–0.25)
PROT SERPL-MCNC: 5.5 G/DL (ref 6–8.5)
QT INTERVAL: 300 MS
QT INTERVAL: 328 MS
QT INTERVAL: 408 MS
QTC INTERVAL: 385 MS
QTC INTERVAL: 440 MS
QTC INTERVAL: 471 MS
RBC # BLD AUTO: 4.14 10*6/MM3 (ref 3.77–5.28)
SODIUM SERPL-SCNC: 140 MMOL/L (ref 136–145)
TSH SERPL DL<=0.05 MIU/L-ACNC: 1.23 UIU/ML (ref 0.27–4.2)
WBC NRBC COR # BLD AUTO: 7.17 10*3/MM3 (ref 3.4–10.8)

## 2025-03-07 PROCEDURE — 93010 ELECTROCARDIOGRAM REPORT: CPT | Performed by: INTERNAL MEDICINE

## 2025-03-07 PROCEDURE — 25010000002 FUROSEMIDE PER 20 MG: Performed by: INTERNAL MEDICINE

## 2025-03-07 PROCEDURE — 94726 PLETHYSMOGRAPHY LUNG VOLUMES: CPT

## 2025-03-07 PROCEDURE — 99239 HOSP IP/OBS DSCHRG MGMT >30: CPT | Performed by: INTERNAL MEDICINE

## 2025-03-07 PROCEDURE — 99222 1ST HOSP IP/OBS MODERATE 55: CPT | Performed by: INTERNAL MEDICINE

## 2025-03-07 PROCEDURE — 85025 COMPLETE CBC W/AUTO DIFF WBC: CPT | Performed by: INTERNAL MEDICINE

## 2025-03-07 PROCEDURE — 93005 ELECTROCARDIOGRAM TRACING: CPT | Performed by: INTERNAL MEDICINE

## 2025-03-07 PROCEDURE — 83036 HEMOGLOBIN GLYCOSYLATED A1C: CPT

## 2025-03-07 PROCEDURE — 82948 REAGENT STRIP/BLOOD GLUCOSE: CPT

## 2025-03-07 PROCEDURE — 80053 COMPREHEN METABOLIC PANEL: CPT | Performed by: INTERNAL MEDICINE

## 2025-03-07 RX ORDER — GINGER ROOT/GINGER ROOT EXT 262.5 MG
1 CAPSULE ORAL DAILY
Status: CANCELLED | OUTPATIENT
Start: 2025-03-07

## 2025-03-07 RX ORDER — LOSARTAN POTASSIUM 25 MG/1
25 TABLET ORAL DAILY
Status: DISCONTINUED | OUTPATIENT
Start: 2025-03-07 | End: 2025-03-07 | Stop reason: HOSPADM

## 2025-03-07 RX ORDER — FUROSEMIDE 40 MG/1
40 TABLET ORAL DAILY
Qty: 30 TABLET | Refills: 1 | Status: SHIPPED | OUTPATIENT
Start: 2025-03-07

## 2025-03-07 RX ORDER — METOPROLOL SUCCINATE 25 MG/1
25 TABLET, EXTENDED RELEASE ORAL EVERY 12 HOURS SCHEDULED
Qty: 30 TABLET | Refills: 1 | Status: SHIPPED | OUTPATIENT
Start: 2025-03-07

## 2025-03-07 RX ORDER — BISACODYL 10 MG
10 SUPPOSITORY, RECTAL RECTAL DAILY PRN
Status: DISCONTINUED | OUTPATIENT
Start: 2025-03-07 | End: 2025-03-07 | Stop reason: HOSPADM

## 2025-03-07 RX ORDER — FUROSEMIDE 10 MG/ML
20 INJECTION INTRAMUSCULAR; INTRAVENOUS ONCE
Status: COMPLETED | OUTPATIENT
Start: 2025-03-07 | End: 2025-03-07

## 2025-03-07 RX ORDER — METOPROLOL SUCCINATE 25 MG/1
25 TABLET, EXTENDED RELEASE ORAL EVERY 12 HOURS SCHEDULED
Status: DISCONTINUED | OUTPATIENT
Start: 2025-03-07 | End: 2025-03-07 | Stop reason: HOSPADM

## 2025-03-07 RX ORDER — BISACODYL 5 MG/1
5 TABLET, DELAYED RELEASE ORAL DAILY PRN
Status: DISCONTINUED | OUTPATIENT
Start: 2025-03-07 | End: 2025-03-07 | Stop reason: HOSPADM

## 2025-03-07 RX ORDER — GLUCAGON 1 MG/ML
1 KIT INJECTION
Status: DISCONTINUED | OUTPATIENT
Start: 2025-03-07 | End: 2025-03-07 | Stop reason: HOSPADM

## 2025-03-07 RX ORDER — ZINC SULFATE 50(220)MG
220 CAPSULE ORAL DAILY
COMMUNITY

## 2025-03-07 RX ORDER — AMOXICILLIN 250 MG
2 CAPSULE ORAL 2 TIMES DAILY PRN
Status: DISCONTINUED | OUTPATIENT
Start: 2025-03-07 | End: 2025-03-07 | Stop reason: HOSPADM

## 2025-03-07 RX ORDER — DILTIAZEM HCL/D5W 125 MG/125
5-15 PLASTIC BAG, INJECTION (ML) INTRAVENOUS
Status: DISCONTINUED | OUTPATIENT
Start: 2025-03-07 | End: 2025-03-07

## 2025-03-07 RX ORDER — ZINC SULFATE 50(220)MG
220 CAPSULE ORAL DAILY
Status: DISCONTINUED | OUTPATIENT
Start: 2025-03-07 | End: 2025-03-07 | Stop reason: HOSPADM

## 2025-03-07 RX ORDER — DEXTROSE MONOHYDRATE 25 G/50ML
25 INJECTION, SOLUTION INTRAVENOUS
Status: DISCONTINUED | OUTPATIENT
Start: 2025-03-07 | End: 2025-03-07 | Stop reason: HOSPADM

## 2025-03-07 RX ORDER — METFORMIN HYDROCHLORIDE 500 MG/1
500 TABLET, EXTENDED RELEASE ORAL
COMMUNITY

## 2025-03-07 RX ORDER — ASCORBIC ACID 500 MG
500 TABLET ORAL DAILY
Status: CANCELLED | OUTPATIENT
Start: 2025-03-07

## 2025-03-07 RX ORDER — DIPHENOXYLATE HYDROCHLORIDE AND ATROPINE SULFATE 2.5; .025 MG/1; MG/1
1 TABLET ORAL DAILY
Status: CANCELLED | OUTPATIENT
Start: 2025-03-07

## 2025-03-07 RX ORDER — ASCORBIC ACID 500 MG
250 TABLET ORAL DAILY
Status: DISCONTINUED | OUTPATIENT
Start: 2025-03-07 | End: 2025-03-07 | Stop reason: HOSPADM

## 2025-03-07 RX ORDER — OMEGA-3S/DHA/EPA/FISH OIL/D3 300MG-1000
400 CAPSULE ORAL DAILY
Status: CANCELLED | OUTPATIENT
Start: 2025-03-07

## 2025-03-07 RX ORDER — ZINC SULFATE 50(220)MG
220 CAPSULE ORAL DAILY
Status: CANCELLED | OUTPATIENT
Start: 2025-03-07

## 2025-03-07 RX ORDER — NITROGLYCERIN 0.4 MG/1
0.4 TABLET SUBLINGUAL
Status: DISCONTINUED | OUTPATIENT
Start: 2025-03-07 | End: 2025-03-07 | Stop reason: HOSPADM

## 2025-03-07 RX ORDER — POLYETHYLENE GLYCOL 3350 17 G/17G
17 POWDER, FOR SOLUTION ORAL DAILY PRN
Status: DISCONTINUED | OUTPATIENT
Start: 2025-03-07 | End: 2025-03-07 | Stop reason: HOSPADM

## 2025-03-07 RX ORDER — MULTIPLE VITAMINS W/ MINERALS TAB 9MG-400MCG
1 TAB ORAL DAILY
Status: DISCONTINUED | OUTPATIENT
Start: 2025-03-07 | End: 2025-03-07 | Stop reason: HOSPADM

## 2025-03-07 RX ORDER — NICOTINE POLACRILEX 4 MG
15 LOZENGE BUCCAL
Status: DISCONTINUED | OUTPATIENT
Start: 2025-03-07 | End: 2025-03-07 | Stop reason: HOSPADM

## 2025-03-07 RX ORDER — DIPHENOXYLATE HYDROCHLORIDE AND ATROPINE SULFATE 2.5; .025 MG/1; MG/1
1 TABLET ORAL DAILY
Status: DISCONTINUED | OUTPATIENT
Start: 2025-03-07 | End: 2025-03-07 | Stop reason: HOSPADM

## 2025-03-07 RX ORDER — LOSARTAN POTASSIUM 25 MG/1
25 TABLET ORAL DAILY
Status: CANCELLED | OUTPATIENT
Start: 2025-03-07

## 2025-03-07 RX ADMIN — LOSARTAN POTASSIUM 25 MG: 25 TABLET, FILM COATED ORAL at 14:49

## 2025-03-07 RX ADMIN — Medication 5 MG/HR: at 05:57

## 2025-03-07 RX ADMIN — FUROSEMIDE 20 MG: 10 INJECTION, SOLUTION INTRAMUSCULAR; INTRAVENOUS at 14:50

## 2025-03-07 RX ADMIN — ZINC SULFATE 220 MG (50 MG) CAPSULE 220 MG: CAPSULE at 14:50

## 2025-03-07 RX ADMIN — APIXABAN 5 MG: 5 TABLET, FILM COATED ORAL at 14:50

## 2025-03-07 RX ADMIN — METOPROLOL SUCCINATE 25 MG: 25 TABLET, EXTENDED RELEASE ORAL at 14:49

## 2025-03-07 RX ADMIN — Medication 1 TABLET: at 14:50

## 2025-03-07 RX ADMIN — MULTIVITAMIN TABLET 1 TABLET: TABLET at 14:49

## 2025-03-07 RX ADMIN — OXYCODONE HYDROCHLORIDE AND ACETAMINOPHEN 250 MG: 500 TABLET ORAL at 14:48

## 2025-03-07 NOTE — DISCHARGE SUMMARY
[facility] HOSPITALIST MEDICINE DISCHARGE SUMMARY    Patient Identification:  Name:  Suellen Matamoros  Age:  84 y.o.  Sex:  female  :  1940  MRN:  [id number]  Visit Number:  [id number]    Date of Admission: 3/6/2025  Date of Discharge: 3/7/2025    PCP: Janie Reid PA    DISCHARGE DIAGNOSIS   A-fib with RVR  Acute hypoxic respiratory failure (present on admission, now resolved)  Bilateral pleural effusions  Acute on chronic HFpEF  Essential hypertension      CONSULTS  Dr. Navas, Cardiology      PROCEDURES PERFORMED   None      HOSPITAL COURSE  Ms. Matamoros is a 84 y.o. female who presented to [facility] ED on 3/7/2025 with a chief complaint of shortness of breath.  Patient has a past medical history remarkable for atrial fibrillation, chronic HFpEF, moderate to severe mitral valve regurgitation, essential hypertension and macular degeneration.  Patient reports symptoms of shortness of breath began approximately 1 to 2 months prior but got progressively worse over the past 1 week.  She reported bilateral ankle edema but denied chest pain, palpitations, fevers, cough or any other symptoms.  Patient reports that she does not use supplemental oxygen at home but did require 2 L nasal cannula upon arrival to the emergency department.  Initial evaluation in the emergency department did consist of basic laboratory work as well as physical exam and vital signs.  Initial vital signs found patient's blood pressure 117/81, respiratory rate 20, heart rate 150, temperature 98 °F with oxygen saturation 99% on room air.  Initial lab work did include ABG, CBC and CMP.  ABG on room air demonstrated normal pH with pO2 of 67, corresponding to oxygen saturation of 92% on room air.  However, later in patient's evaluation in the emergency department she did develop hypoxic respiratory failure requiring 2 L nasal cannula.  CBC and CMP were obtained and essentially within normal limits.   Procalcitonin was obtained and within normal limits.  CT chest was obtained which demonstrated findings suggestive of CHF.  With this in mind, patient was diagnosed with acute hypoxic respiratory failure secondary to acute on chronic HFpEF likely exacerbated by A-fib with RVR.  Patient was then admitted for further treatment and evaluation.    In regards to A-fib with RVR, patient was started on Cardizem drip and admitted to our telemetry unit.  Patient was continued on Cardizem and then transition to Toprol-XL 25 mg p.o. daily and then titrated off of Cardizem drip.  Patient's heart rate is now sustained in the mid 90s and remains in A-fib.  Patient initially required 2 L nasal cannula to maintain O2 saturation greater than 90%.  After receiving IV Lasix in the emergency department, patient did diurese well and patient no longer required supplemental oxygen.  Patient was able to maintain O2 saturation greater than 90% on room air prior to discharge.  Patient was also able to ambulate independently to her bathroom with no issues.  It should be noted patient was previously scheduled to have outpatient electrical cardioversion for longstanding history of A-fib.  This procedure was offered to her during this hospitalization but she declined stating her son would prefer she complete treatment for presumed pneumonia with hypoxic respiratory failure.  Patient was educated that while she may have been told this in the emergency department, updated data appears most consistent with pleural effusion secondary to decompensated CHF and there was no evidence of pneumonia at this time.  White blood cell count was within normal limits, procalcitonin was within normal limits and CT findings appeared more consistent with pleural effusion from CHF.  Nevertheless, patient did elect to postpone electrical cardioversion during this hospitalization.  With this in mind, patient's hypoxic respiratory failure has completely resolved and  patient has returned to rate controlled A-fib.  Patient has been able to ambulate independently throughout her room.  With this in mind, it is felt patient has reached maximum medical benefit of current hospitalization and will be discharged home in stable condition today.  The beforementioned plan was thoroughly discussed with the patient and she expressed understanding and willingness to proceed with the beforementioned plan.    VITAL SIGNS:      03/06/25  1724 03/07/25  0041 03/07/25  0500   Weight: 68.5 kg (151 lb) 77.9 kg (171 lb 12.8 oz) (new admitt, less then 24 hours.)     Body mass index is 31.42 kg/m².    PHYSICAL EXAM:  General -well-nourished  female appearing stated age in no apparent distress  HEENT -head normocephalic and atraumatic, pupils equally round and reactive to light  Lungs -decreased breath sounds in right base with bibasilar inspiratory crackles  Cardiovascular -irregular rhythm with heart rate in the 90s with 2+ systolic murmur  GI -abdomen soft, nontender nondistended  Neurological -cranial nerves II through XII intact with no focal deficit or unintentional motor movement appreciated    DISCHARGE DISPOSITION   Stable    DISCHARGE MEDICATIONS:     Discharge Medications        New Medications        Instructions Start Date   furosemide 40 MG tablet  Commonly known as: Lasix   40 mg, Oral, Daily      metoprolol succinate XL 25 MG 24 hr tablet  Commonly known as: TOPROL-XL   25 mg, Oral, Every 12 Hours Scheduled             Continue These Medications        Instructions Start Date   apixaban 5 MG tablet tablet  Commonly known as: ELIQUIS   5 mg, Oral, 2 Times Daily      BERBERINE COMPLEX PO   1 capsule, Oral, Daily      CALCIUM 500+D PO   1 capsule, Oral, Daily      losartan 25 MG tablet  Commonly known as: COZAAR   25 mg, Oral, Daily      metFORMIN  MG 24 hr tablet  Commonly known as: GLUCOPHAGE-XR   500 mg, Daily With Breakfast      multivitamin tablet tablet   1 tablet,  Daily      multivitamin with minerals tablet tablet   1 tablet, Daily      ICAPS AREDS 2 PO   1 capsule, Daily      OMEGA 3 PO   1,000 Units, Daily      VITAMIN C PO   250 mg, Oral, Daily      VITAMIN D-3 PO   400 Units, Oral, Daily      zinc sulfate 220 (50 Zn) MG capsule  Commonly known as: ZINCATE   220 mg, Daily                 Your Scheduled Appointments      Mar 17, 2025 2:00 PM  Initial Evaluation with [facility]  [facility] ([place]) [address] [facility] [address] [phone]   Bring all previous medical records and films, along with current medications and insurance information.         Mar 19, 2025 8:30 AM  Consult with Janeth Thomas MD  [facility] CARDIOLOGY ([address]  [facility] [address] [phone]   Please bring all current insurance documents.  Bring list of current medications.  Please arrive 30 minutes prior to your scheduled appointment time.       Mar 24, 2025 3:00 PM  Follow Up with Yo Ibanez PA-C  [facility] CARDIOLOGY ([place]) [address]  [facility] [address] [phone]   -Bring photo ID, insurance card, and list of medications to appointment  -If [facility] was [facility] outside of [facility] then patient must bring images on a disc  -Copay will be collected at time of appointment  -Established patients should arrive 10 minutes prior to appointment                Additional Instructions for the Follow-ups that You Need to Schedule       Discharge Follow-up with PCP   As directed       Currently Documented PCP:    Janie Reid PA    PCP Phone Number:    [phone]     Follow Up Details: please follow up with pcp in 1 week        Discharge Follow-up with Specialty: Cardiology; 2 Weeks   As directed      Specialty: Cardiology   Follow Up: 2 Weeks   Follow Up Details: Afib with RVR               Follow-up Information       Janie Reid  [facility] .    Specialty: Family Medicine  Why: please follow up with pcp in 1 week  Contact information:  [address] [place] [address]  [facility] [address]  [phone]                             TEST  RESULTS PENDING AT DISCHARGE  Pending Labs       Order Current Status    Blood Culture - Blood, Arm, Left In process    Blood Culture - Blood, Arm, Right In process    Urine Culture - Urine, Urine, Clean Catch In process             Colin Barnes,   03/07/25  17:30 EST    Please note that this discharge summary required more than 30 minutes to complete.    Please send a copy of this dictation to the following providers:  Janie Reid PA

## 2025-03-07 NOTE — NURSING NOTE
Transitional Care Note    Enrolled in [facility] Transitional Care Note    Enrolled in [facility] Transitional Care Services under theTCM Model to be followed for 6 weeks post discharge.  BTC will assist with support and education at time of transition home from hospital.  Hospital  will follow throughout the stay at [facility].  Home  will visit within 48 hours of discharge and follow with home vitals and telephone contact for 6 weeks.      Patient admitted to [facility] via Emergency Department. Admitted for further treatment of a-fib w/ rvr.    Explained transition to home program and Patient is agreeable to home visits.    Home  will be Natalia Taylor LPN

## 2025-03-07 NOTE — PLAN OF CARE
Goal Outcome Evaluation:  Patient is resting in bed with family at bedside. Patient has tolerated all interventions. No complaints or concerns at this time. No signs of acute distress noted. Will continue to follow plan of care.

## 2025-03-07 NOTE — CASE MANAGEMENT/SOCIAL WORK
Discharge Planning Assessment   Luis Manuel     Patient Name: Suellen Matamoros  MRN: [id number]  Today's Date: 3/7/2025    Admit Date: 3/6/2025    Plan: SS received consult per Case Management for discharge planning.  SS spoke with pt at bedside on this date.  Pt resides at [address] [place] and plans to return home at discharge.  Pt currently does not utilize home health or DME.  Pt's PCP is Janie Lewis.  Pt utilizes [facility].  Pt transports via private auto per family.  SS will follow and assist with discharge needs.   Discharge Needs Assessment       Row Name 03/07/25 1521       Living Environment    People in Home alone    Current Living Arrangements home    Primary Care Provided by self    Provides Primary Care For no one    Quality of Family Relationships helpful;involved;supportive       Resource/Environmental Concerns    Resource/Environmental Concerns none       Transition Planning    Patient/Family Anticipates Transition to home    Transportation Anticipated family or friend will provide                   Discharge Plan       Row Name 03/07/25 1521       Plan    Plan SS received consult per Case Management for discharge planning.  SS spoke with pt at bedside on this date.  Pt resides at [address] [place] and plans to return home at discharge.  Pt currently does not utilize home health or DME.  Pt's PCP is Janie Lewis.  Pt utilizes [facility].  Pt transports via private auto per family.  SS will follow and assist with discharge needs.                  Continued Care and Services - Admitted Since 3/6/2025    No active coordination exists for this encounter.       Expected Discharge Date and Time       Expected Discharge Date Expected Discharge Time    Mar 9, 2025            Demographic Summary       Row Name 03/07/25 1520       General Information    Admission Type inpatient    Arrived From home    Referral Source nursing    Reason for Consult discharge planning    Preferred  Language English                  Lula Chase, BSW

## 2025-03-07 NOTE — H&P
[facility] Medicine Services  History & Physical    Patient Identification:  Name:  Suellen Matamoros  Age:  84 y.o.  Sex:  female  :  1940  MRN:  [id number]   Visit Number:  [id number]  Admit Date: 3/6/2025   Primary Care Physician:  Janie Reid PA    Subjective     Chief complaint: Shortness of Breath    History of presenting illness:      Suellen Matamoros is a 84 y.o. female with past medical history significant for atrial fibrillation, chronic anticoagulation with Eliquis, chronic HFpEF, moderate to severe mitral valve regurgitation, mild aortic valve stenosis, hypertension, and macular degeneration. Patient presents to [facility] Emergency Department for further evaluation of shortness of breath. Patient states that symptoms began approximately 1-2 months ago but have progressively worsened over the last 1-2 weeks. She also reports some bilateral ankle edema. Denies chest pain, palpitations, fever, chills, cough, sputum production, N/V/D, or other acute complaints. She was on 2L nasal cannula at the time of my evaluation; states that she does not use supplemental O2 at home. She states that she lives alone. Has been compliant with home medications (although forgot her morning dose of medicines this past Tuesday). She denies use of walker or cane for ambulation assistance. Denies smoking or alcohol abuse. Patient found to be in Afib RVR on arrival to ED and she was placed on Cardizem gtt. Cardizem gtt infusing at 5 mg/hr during my evaluation and HR appeared to be Afib in the 80s on telemetry monitor. Patient denied any needs or concerns. Discussed plans with patient and family member at bedside.     Upon arrival to the ED, vital signs were temperature 98, pulse 150, respirations 20, blood pressure 117/81, SpO2 saturation 99% on room air.  ABG on room air revealed normal pH and pCO2.  pO2 was 67.2 and O2 saturation was 92%.  High-sensitivity troponin T 18 with a -2  numeric delta.  proBNP 1099.  CMP with chloride 108, glucose 130, AST 40, ALT 44, otherwise unremarkable.  TSH normal.  Lactate and Pro-Arnie nonelevated.  PT 15.9, INR 1.25.  CBC with MCHC 30.9, otherwise unremarkable.  Urinalysis with 1+ leukocytes, trace protein, and 21-50 WBCs.  No bacteria and no nitrites.  Urine culture is pending.  Peripheral blood cultures x 2 pending.  Respiratory panel negative.  Chest x-ray notes enlarged heart size.  Central pulmonary vascular congestion with edema.  Small to medium size right pleural effusion.  Mild compressive atelectasis at the right lung base.  CT angiogram of the chest noted findings compatible with heart failure.    Known Emergency Department medications received prior to my evaluation included 3 g IV Unasyn, 500 mg IV azithromycin, 1 L LR bolus, Cardizem infusion. [facility] Discussed with admitting physician, Merle Baumann DO.      ---------------------------------------------------------------------------------------------------------------------   Review of Systems   Constitutional:  Negative for chills, fatigue and fever.   HENT:  Negative for congestion, sore throat and trouble swallowing.    Respiratory:  Positive for shortness of breath. Negative for cough and wheezing.    Cardiovascular:  Positive for leg swelling (mild, bilateral ankles). Negative for chest pain and palpitations.   Gastrointestinal:  Negative for abdominal pain, constipation, diarrhea, nausea and vomiting.   Genitourinary:  Negative for difficulty urinating, flank pain, frequency and urgency.   Musculoskeletal:  Negative for back pain, gait problem, neck pain and neck stiffness.   Neurological:  Negative for dizziness, tremors, seizures, syncope, facial asymmetry, speech difficulty, weakness, light-headedness, numbness and headaches.      ---------------------------------------------------------------------------------------------------------------------   Past Medical History:   Diagnosis Date    Macular degeneration      Past Surgical History:   Procedure Laterality Date    HERNIA REPAIR      OVARY SURGERY      TONSILLECTOMY       Family History   Problem Relation Age of Onset    Pulmonary embolism Mother      Social History     Socioeconomic History    Marital status:    Tobacco Use    Smoking status: Never    Smokeless tobacco: Never   Vaping Use    Vaping status: Never Used   Substance and Sexual Activity    Alcohol use: Never    Drug use: Never    Sexual activity: Defer     ---------------------------------------------------------------------------------------------------------------------   Allergies:  Patient has no known allergies.  ---------------------------------------------------------------------------------------------------------------------   Home medications:    Medications below are reported home medications pulling from within the system; at this time, these medications have not been reconciled unless otherwise specified and are in the verification process for further verifcation as current home medications.  Medications Prior to Admission   Medication Sig Dispense Refill Last Dose/Taking    Ascorbic Acid (VITAMIN C PO) Take 250 mg by mouth Daily.   3/6/2025 Morning    Barberry-Oreg Grape-Goldenseal (BERBERINE COMPLEX PO) Take 1 capsule by mouth Daily.   3/6/2025 Morning    Calcium Carb-Cholecalciferol (CALCIUM 500+D PO) Take 1 capsule by mouth Daily.   3/6/2025 Morning    Cholecalciferol (VITAMIN D-3 PO) Take 400 Units by mouth Daily.   3/6/2025 Morning    losartan (COZAAR) 25 MG tablet Take 1 tablet by mouth Daily.   3/6/2025 Morning    metFORMIN ER (GLUCOPHAGE-XR) 500 MG 24 hr tablet Take 1 tablet by mouth Daily With Breakfast.   3/6/2025 Morning    Multiple Vitamins-Minerals (ICAPS AREDS 2 PO) Take 1 capsule by  mouth Daily.   3/6/2025 Morning    multivitamin (MULTI VITAMIN PO) Take 1 tablet by mouth Daily.   3/6/2025 Morning    multivitamin with minerals (VISION FORMULA PO) Take 1 tablet by mouth Daily.   3/6/2025 Morning    Omega-3 Fatty Acids (OMEGA 3 PO) Take 1,000 Units by mouth Daily.   3/6/2025 Morning    zinc sulfate (ZINCATE) 220 (50 Zn) MG capsule Take 1 capsule by mouth Daily.   3/6/2025 Morning    apixaban (ELIQUIS) 5 MG tablet tablet Take 1 tablet by mouth 2 (Two) Times a Day. 60 tablet 2        Hospital Scheduled Meds:     dilTIAZem, 5-15 mg/hr, Last Rate: 5 mg/hr (03/07/25 0030)        Current listed hospital scheduled medications may not yet reflect those currently placed in orders that are signed and held awaiting patient's arrival to floor.   ---------------------------------------------------------------------------------------------------------------------     Objective     Vital Signs:  Temp:  [97.8 °F (36.6 °C)-98 °F (36.7 °C)] 97.8 °F (36.6 °C)  Heart Rate:  [] 78  Resp:  [20] 20  BP: (102-153)/(72-96) 131/90      03/06/25  1724 03/07/25  0041   Weight: 68.5 kg (151 lb) 77.9 kg (171 lb 12.8 oz)     Body mass index is 31.42 kg/m².  ---------------------------------------------------------------------------------------------------------------------       Physical Exam  Vitals and nursing note reviewed.   Constitutional:       General: She is awake. She is not in acute distress.     Appearance: She is not ill-appearing or diaphoretic.   HENT:      Head: Normocephalic and atraumatic.      Mouth/Throat:      Mouth: Mucous membranes are moist.      Pharynx: Oropharynx is clear.   Eyes:      Extraocular Movements: Extraocular movements intact.      Pupils: Pupils are equal, round, and reactive to light.   Cardiovascular:      Rate and Rhythm: Normal rate. Rhythm irregular.      Pulses: Normal pulses.           Dorsalis pedis pulses are 2+ on the right side and 2+ on the left side.      Heart sounds:  Murmur heard.      No friction rub.      Comments: No lower extremity edema appreciated on my exam.  Pulmonary:      Effort: Pulmonary effort is normal. No accessory muscle usage, respiratory distress or retractions.      Breath sounds: Examination of the right-lower field reveals rales. Rales present. No wheezing or rhonchi.   Abdominal:      General: Bowel sounds are normal. There is no distension.      Palpations: Abdomen is soft.      Tenderness: There is no abdominal tenderness. There is no guarding.   Musculoskeletal:      Cervical back: Neck supple. No rigidity.      Right lower leg: No edema.      Left lower leg: No edema.   Skin:     General: Skin is warm and dry.      Capillary Refill: Capillary refill takes 2 to 3 seconds.   Neurological:      Mental Status: She is alert and oriented to person, place, and time. Mental status is at baseline.      Cranial Nerves: No dysarthria or facial asymmetry.      Sensory: Sensation is intact.      Motor: No weakness or tremor.   Psychiatric:         Attention and Perception: Attention normal.         Mood and Affect: Mood normal.         Speech: Speech normal.         Behavior: Behavior normal. Behavior is cooperative.         Thought Content: Thought content normal.         Cognition and Memory: Cognition normal.         Judgment: Judgment normal.       ---------------------------------------------------------------------------------------------------------------------  EKG: Pending formal Cardiology interpretation. Per my review, appears Afib RVR 140s.     Telemetry:  Appearing Afib 80s during my evaluation.   I have personally looked at both the EKG and the telemetry strips.  ---------------------------------------------------------------------------------------------------------------------   Results from last 7 days   Lab Units 03/07/25  0244 03/06/25  1757   LACTATE mmol/L  --  1.4   WBC 10*3/mm3 7.17 7.12   HEMOGLOBIN g/dL 12.0 13.2   HEMATOCRIT % 38.7 42.7  "  MCV fL 93.5 93.4   MCHC g/dL 31.0* 30.9*   PLATELETS 10*3/mm3 200 249   INR   --  1.25*     Results from last 7 days   Lab Units 03/06/25  1744   PH, ARTERIAL pH units 7.425   PO2 ART mm Hg 67.2*   PCO2, ARTERIAL mm Hg 35.6   HCO3 ART mmol/L 23.3     Results from last 7 days   Lab Units 03/06/25 1850 03/06/25 1757 03/05/25  1355   SODIUM mmol/L  --  144 137   POTASSIUM mmol/L  --  4.7 4.2   MAGNESIUM mg/dL 2.0 1.9  --    CHLORIDE mmol/L  --  108* 105   CO2 mmol/L  --  25.0 22.2   BUN mg/dL  --  23 22   CREATININE mg/dL  --  0.82 0.68   CALCIUM mg/dL  --  9.3 9.4   GLUCOSE mg/dL  --  130* 138*   ALBUMIN g/dL  --  3.9  --    BILIRUBIN mg/dL  --  0.4  --    ALK PHOS U/L  --  87  --    AST (SGOT) U/L  --  40*  --    ALT (SGPT) U/L  --  44*  --    Estimated Creatinine Clearance: 50.2 mL/min (by C-G formula based on SCr of 0.82 mg/dL).  No results found for: \"AMMONIA\"  Results from last 7 days   Lab Units 03/06/25 1850 03/06/25 1757   HSTROP T ng/L 16* 18*     Results from last 7 days   Lab Units 03/06/25 1757 03/05/25  1355   PROBNP pg/mL 1,099.0 1,063.0     No results found for: \"HGBA1C\"  Lab Results   Component Value Date    TSH 1.230 03/06/2025     No results found for: \"PREGTESTUR\", \"PREGSERUM\", \"HCG\", \"HCGQUANT\"  Pain Management Panel           No data to display                  ---------------------------------------------------------------------------------------------------------------------  Imaging Results (Last 7 Days)       Procedure Component Value Units Date/Time    CT Angiogram Chest Pulmonary Embolism [[id number]] Collected: 03/06/25 1910     Updated: 03/06/25 1918    Narrative:      Comparison: None.     Patient motion artifact limits evaluation.      Subcentimeter left thyroid nodule with calcification. No pulmonary  artery embolism is identified. Heart is enlarged with moderate right  pleural effusion and mild left pleural effusion, correlate for heart  failure. Retrograde enhancement IVC and " hepatic veins also seen.  Moderate atelectasis right lower lung with mild atelectasis right middle  lung. Interlobular septal thickening and groundglass appearance favors  pulmonary edema, no pneumothorax is seen. Potential splenic artery  aneurysm, image 133 although partially imaged.       Impression:      Impression:  1. Findings compatible with heart failure.     This report was finalized on 3/6/2025 7:16 PM by Priyank Rasmussen DO.       XR Chest 1 View [[id number]] Collected: 03/06/25 1858     Updated: 03/06/25 1901    Narrative:      PROCEDURE: Portable chest x-ray examination performed on March 6, 2025.  Single view. Upright position.     HISTORY: Shortness of breath.     COMPARISON: None.     FINDINGS:     Enlarged heart size.  Small to medium sized right pleural effusion.  Central pulmonary vascular congestion with edema.  No left pleural effusion. No pneumothorax.  Old healed left posterior fourth rib fracture.  No acute fracture or dislocation.  No free air in the upper abdomen.  Mild osteoarthritis at the glenohumeral joints.       Impression:         Enlarged heart size  Central pulmonary vascular congestion with edema.  Small to medium size right pleural effusion.  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  No pneumothorax.  Old healed left posterior fourth rib fracture.     This report was finalized on 3/6/2025 6:59 PM by Jayme Bates MD.               Last echocardiogram:  Results for orders placed during the hospital encounter of 11/22/24    Adult Transthoracic Echo Complete W/ Cont if Necessary Per Protocol    Interpretation Summary    Left ventricular systolic function is normal. Left ventricular ejection fraction appears to be 66 - 70%.    The left ventricular cavity is mildly dilated.    Left ventricular diastolic function is consistent with (grade Ia w/high LAP) impaired relaxation.    The left atrial cavity is moderately  dilated.    The right atrial cavity is mildly  dilated.    Mild aortic valve stenosis is present.    Moderate to severe mitral valve regurgitation is present.    Estimated right ventricular systolic pressure from tricuspid regurgitation is normal (<35 mmHg).    Echogenic consistency of myocardium consistent with possible infiltrative process, consider cardiac amyloidosis.          I have personally reviewed the above radiology images and read the final radiology report on 03/07/25  ---------------------------------------------------------------------------------------------------------------------  Assessment / Plan     Active Hospital Problems    Diagnosis  POA    **Atrial fibrillation with RVR [I48.91]  Yes       ASSESSMENT/PLAN:  #Atrial fibrillation with RVR on arrival  #Chronic anticoagulation with Eliquis  #Suspect acute on chronic HFpEF (POA)  #Mildly elevated transaminase levels (POA)  #Moderate to severe mitral valve regurgitation  #Mild aortic valve stenosis  #Hypertension  #History of macular degeneration  --HR 150s on arrival and EKG confirmed Afib w/ RVR.   --Placed on Cardizem gtt in the ED; HR now improved. Continue Cardizem gtt for now (currently infusing at 5 mg/hr).   --TSH and magnesium WNL.   --Continuous cardiac monitoring ordered.   --HS Troponin T mildly elevated without significant delta (18=>16). Denies recent or current CP on exam.   --proBNP mildly elevated at 1,099.   --CT angiogram chest noted findings consistent with CHF/edema.   --TTE from November 2024 reviewed; revealed EF 66-70%.   --Obtain Reds Vest.   --80 mg IV Lasix ordered x1.   --Monitor response with strict I's and O's.   --Patient initially given IV abx in ED along with 1L LR bolus; peripheral blood cultures x2 obtained prior to antibiotic administration (pending).   --Procal negative. Lactate nonelevated. No leukocytosis. Currently do not suspect pneumonia/infection. Monitor off abx for now.   --Inpatient Cardiology Consult  placed; greatly appreciate their assistance.   --Hold NPO pending formal Cardiology evaluation.   --LFTs are mildly elevated; likely due to congestion from acute on chronic HFpEF. However, will obtain hepatitis panel. Avoid hepatotoxic agents as able.   --Repeat labs in the AM (CBC and CMP).       ----------  -DVT prophylaxis: Eliquis will serve.   -Activity: As tolerated. Maintain fall precautions.   -Expected length of stay:   INPATIENT status due to the need for care which can only be reasonably provided in an hospital setting such as aggressive/expedited ancillary services and/or consultation services, the necessity for IV medications, close physician monitoring and/or the possible need for procedures.  In such, I feel patient's risk for adverse outcomes and need for care warrant INPATIENT evaluation and predict the patient's care encounter to likely last beyond 2 midnights.   -Disposition plans to return home on discharge once medically stable.     High risk secondary to Afib RVR and suspected acute on chronic HFpEF.       CODE STATUS: FULL CODE, discussed with patient at bedside in 311A.       Jasmine Rangel, [facility]   03/07/25  03:26 EST  Pager #[phone]  ---------------------------------------------------------------------------------------------------------------------

## 2025-03-07 NOTE — PLAN OF CARE
Goal Outcome Evaluation:   Pt transferred from ER to 3S. Pt is currently resting in bed. VSS with no complaints or S/S of distress. Will continue to follow plan of care.

## 2025-03-07 NOTE — CONSULTS
Referring Provider: Colin Barnes,*    Reason for Consultation: Atrial fibrillation with rapid ventricular rate.      Patient Care Team:  Janie Reid PA as PCP - General (Family Medicine)      SUBJECTIVE     Chief Complaint: Shortness of breath    History of present illness:  Suellen Matamoros is a 84 y.o. female  with hypertension, hyperlipidemia, diabetes, mitral regurgitation, atrial fibrillation on anticoagulation, HFpEF who presents to the hospital with shortness of breath.  Patient lives alone and has been compliant with her medications.  In the ER she was noted to be in atrial fibrillation with rapid ventricular rate and was started on Cardizem drip.  Of note she was scheduled to undergo YARIEL and cardioversion as outpatient.  proBNP is normal.    Review of systems:    Constitutional: + weakness, fatigue, fever, rigors, chills   Eyes: No vision changes, eye pain   ENT/oropharynx: No difficulty swallowing, sore throat, epistaxis, changes in hearing   Cardiovascular: No chest pain, chest tightness, palpitations, paroxysmal nocturnal dyspnea, orthopnea, diaphoresis, dizziness / syncopal episode   Respiratory: + shortness of breath, dyspnea on exertion, cough, wheezing, hemoptysis   Gastrointestinal: No abdominal pain, nausea, vomiting, diarrhea, bloody stools   Genitourinary: No hematuria, dysuria   Neurological: No headache, tremors, numbness, one-sided weakness    Musculoskeletal: No cramps, myalgias, joint pain, joint swelling   Integument: No rash, edema        Personal History:      Past Medical History:   Diagnosis Date    Macular degeneration        Past Surgical History:   Procedure Laterality Date    HERNIA REPAIR      OVARY SURGERY      TONSILLECTOMY         Family History   Problem Relation Age of Onset    Pulmonary embolism Mother        Social History     Tobacco Use    Smoking status: Never    Smokeless tobacco: Never   Vaping Use    Vaping status: Never Used   Substance Use Topics     Alcohol use: Never    Drug use: Never        Home meds:  Prior to Admission medications    Medication Sig Start Date End Date Taking? Authorizing Provider   Ascorbic Acid (VITAMIN C PO) Take 250 mg by mouth Daily.   Yes Lexx Contreras MD   Barberry-Oreg Grape-Goldenseal (BERBERINE COMPLEX PO) Take 1 capsule by mouth Daily.   Yes Lexx Contreras MD   Calcium Carb-Cholecalciferol (CALCIUM 500+D PO) Take 1 capsule by mouth Daily.   Yes Lexx Contreras MD   Cholecalciferol (VITAMIN D-3 PO) Take 400 Units by mouth Daily.   Yes Lexx Contreras MD   losartan (COZAAR) 25 MG tablet Take 1 tablet by mouth Daily.   Yes Lexx Contreras MD   metFORMIN ER (GLUCOPHAGE-XR) 500 MG 24 hr tablet Take 1 tablet by mouth Daily With Breakfast.   Yes Lexx Contreras MD   Multiple Vitamins-Minerals (ICAPS AREDS 2 PO) Take 1 capsule by mouth Daily.   Yes Lexx Contreras MD   multivitamin (MULTI VITAMIN PO) Take 1 tablet by mouth Daily.   Yes Lexx Contreras MD   multivitamin with minerals (VISION FORMULA PO) Take 1 tablet by mouth Daily.   Yes Lexx Contreras MD   Omega-3 Fatty Acids (OMEGA 3 PO) Take 1,000 Units by mouth Daily.   Yes Lexx Contreras MD   zinc sulfate (ZINCATE) 220 (50 Zn) MG capsule Take 1 capsule by mouth Daily.   Yes Lexx Contreras MD   apixaban (ELIQUIS) 5 MG tablet tablet Take 1 tablet by mouth 2 (Two) Times a Day. 2/28/25   Yo Ibanez PA-C   metFORMIN (GLUCOPHAGE) 500 MG tablet Take 1 tablet by mouth 2 (Two) Times a Day With Meals.  3/7/25  Lexx Contreras MD   Multiple Vitamins-Minerals (ZINC PO) Take  by mouth.  3/7/25  Lexx Contreras MD       Allergies:     Patient has no known allergies.    Scheduled Meds:apixaban, 5 mg, Oral, BID  vitamin C, 250 mg, Oral, Daily  losartan, 25 mg, Oral, Daily  [Held by provider] metFORMIN, 500 mg, Oral, Daily With Breakfast  multivitamin, 1 tablet, Oral, Daily  multivitamin with minerals, 1  "tablet, Oral, Daily  zinc sulfate, 220 mg, Oral, Daily      Continuous Infusions:dilTIAZem, 5-15 mg/hr, Last Rate: 5 mg/hr (03/07/25 0557)      PRN Meds:  senna-docusate sodium **AND** polyethylene glycol **AND** bisacodyl **AND** bisacodyl    dextrose    dextrose    glucagon (human recombinant)    nitroglycerin    sodium chloride      OBJECTIVE    Vital Signs  Vitals:    03/07/25 0521 03/07/25 0536 03/07/25 0557 03/07/25 0704   BP: 150/94 131/84 122/80 128/76   BP Location:  Right arm  Right arm   Patient Position:  Lying  Lying   Pulse: 86 76 81 76   Resp:  18  18   Temp:  97.9 °F (36.6 °C)  97.8 °F (36.6 °C)   TempSrc:  Oral  Oral   SpO2:  95%  98%   Weight:       Height:           Flowsheet Rows      Flowsheet Row First Filed Value   Admission Height 157.5 cm (62\") Documented at 03/06/2025 1724   Admission Weight 68.5 kg (151 lb) Documented at 03/06/2025 1724              Intake/Output Summary (Last 24 hours) at 3/7/2025 0856  Last data filed at 3/6/2025 2131  Gross per 24 hour   Intake 1050 ml   Output --   Net 1050 ml        Telemetry: Atrial fibrillation    Physical Exam:  The patient is alert, oriented and in no distress.  Vital signs as noted above.  Head and neck revealed no carotid bruits or jugular venous distention.  No thyromegaly or lymphadenopathy is present  Lungs clear.  No wheezing.  Breath sounds are normal bilaterally.  Heart: Normal first and second heart sounds. No murmur.  No precordial rub is present.  No gallop is present.  Abdomen: Soft and nontender.  No organomegaly is present.  Extremities with good peripheral pulses with bilateral lower extremity edema  Skin: Warm and dry.  Musculoskeletal system is grossly normal.  CNS grossly normal.       Results Review:  I have personally reviewed the results from the time of this admission to 3/7/2025 08:56 EST and agree with these findings:  []  Laboratory  []  Microbiology  []  Radiology  []  EKG/Telemetry   []  Cardiology/Vascular   []  " Pathology  []  Old records  []  Other:    Most notable findings include:     Lab Results (last 24 hours)       Procedure Component Value Units Date/Time    POC Glucose Once [[id number]]  (Normal) Collected: 03/07/25 0705    Specimen: Blood Updated: 03/07/25 0718     Glucose 100 mg/dL     Hepatitis Panel, Acute [[id number]]  (Normal) Collected: 03/06/25 1717    Specimen: Blood Updated: 03/07/25 0537     Hepatitis B Surface Ag Non-Reactive     Hep A IgM Non-Reactive     Hep B C IgM Non-Reactive     Hepatitis C Ab Non-Reactive    Narrative:      Results may be falsely decreased if patient taking Biotin.     Hemoglobin A1c [[id number]]  (Abnormal) Collected: 03/07/25 0244    Specimen: Blood Updated: 03/07/25 0404     Hemoglobin A1C 6.80 %     Narrative:      Hemoglobin A1C Ranges:    Increased Risk for Diabetes  5.7% to 6.4%  Diabetes                     >= 6.5%  Diabetic Goal                < 7.0%    Comprehensive Metabolic Panel [[id number]]  (Abnormal) Collected: 03/07/25 0244    Specimen: Blood Updated: 03/07/25 0329     Glucose 119 mg/dL      BUN 21 mg/dL      Creatinine 0.68 mg/dL      Sodium 140 mmol/L      Potassium 4.4 mmol/L      Chloride 107 mmol/L      CO2 24.0 mmol/L      Calcium 8.6 mg/dL      Total Protein 5.5 g/dL      Albumin 3.6 g/dL      ALT (SGPT) 42 U/L      AST (SGOT) 36 U/L      Alkaline Phosphatase 76 U/L      Total Bilirubin 0.6 mg/dL      Globulin 1.9 gm/dL      A/G Ratio 1.9 g/dL      BUN/Creatinine Ratio 30.9     Anion Gap 9.0 mmol/L      eGFR 86.5 mL/min/1.73     Narrative:      GFR Categories in Chronic Kidney Disease (CKD)      GFR Category          GFR (mL/min/1.73)    Interpretation  G1                     90 or greater         Normal or high (1)  G2                      60-89                Mild decrease (1)  G3a                   45-59                Mild to moderate decrease  G3b                   30-44                Moderate to severe decrease  G4                    15-29                 Severe decrease  G5                    14 or less           Kidney failure          (1)In the absence of evidence of kidney disease, neither GFR category G1 or G2 fulfill the criteria for CKD.    eGFR calculation 2021 CKD-EPI creatinine equation, which does not include race as a factor    CBC & Differential [[id number]]  (Abnormal) Collected: 03/07/25 0244    Specimen: Blood Updated: 03/07/25 0301    Narrative:      The following orders were created for panel order CBC & Differential.  Procedure                               Abnormality         Status                     ---------                               -----------         ------                     CBC Auto Differential[[id number]]        Abnormal            Final result                 Please view results for these tests on the individual orders.    CBC Auto Differential [[id number]]  (Abnormal) Collected: 03/07/25 0244    Specimen: Blood Updated: 03/07/25 0301     WBC 7.17 10*3/mm3      RBC 4.14 10*6/mm3      Hemoglobin 12.0 g/dL      Hematocrit 38.7 %      MCV 93.5 fL      MCH 29.0 pg      MCHC 31.0 g/dL      RDW 14.3 %      RDW-SD 48.9 fl      MPV 9.0 fL      Platelets 200 10*3/mm3      Neutrophil % 60.3 %      Lymphocyte % 29.0 %      Monocyte % 9.2 %      Eosinophil % 1.0 %      Basophil % 0.4 %      Immature Grans % 0.1 %      Neutrophils, Absolute 4.32 10*3/mm3      Lymphocytes, Absolute 2.08 10*3/mm3      Monocytes, Absolute 0.66 10*3/mm3      Eosinophils, Absolute 0.07 10*3/mm3      Basophils, Absolute 0.03 10*3/mm3      Immature Grans, Absolute 0.01 10*3/mm3      nRBC 0.0 /100 WBC     Procalcitonin [[id number]]  (Normal) Collected: 03/06/25 1757    Specimen: Blood Updated: 03/07/25 0122     Procalcitonin 0.03 ng/mL     Narrative:      As a Marker for Sepsis (Non-Neonates):    1. <0.5 ng/mL represents a low risk of severe sepsis and/or septic shock.  2. >2 ng/mL represents a high risk of severe sepsis and/or septic shock.    As a Marker for Lower  "Respiratory Tract Infections that require antibiotic therapy:    PCT on Admission    Antibiotic Therapy       6-12 Hrs later    >0.5                Strongly Recommended  >0.25 - <0.5        Recommended   0.1 - 0.25          Discouraged              Remeasure/reassess PCT  <0.1                Strongly Discouraged     Remeasure/reassess PCT    As 28 day mortality risk marker: \"Change in Procalcitonin Result\" (>80% or <=80%) if Day 0 (or Day 1) and Day 4 values are available. Refer to http://www.[facility]-pct-calculator.com    Change in PCT <=80%  A decrease of PCT levels below or equal to 80% defines a positive change in PCT test result representing a higher risk for 28-day all-cause mortality of patients diagnosed with severe sepsis for septic shock.    Change in PCT >80%  A decrease of PCT levels of more than 80% defines a negative change in PCT result representing a lower risk for 28-day all-cause mortality of patients diagnosed with severe sepsis or septic shock.       TSH [[id number]]  (Normal) Collected: 03/06/25 1850    Specimen: Blood from Arm, Left Updated: 03/07/25 0121     TSH 1.230 uIU/mL     Magnesium [[id number]]  (Normal) Collected: 03/06/25 1850    Specimen: Blood from Arm, Left Updated: 03/07/25 0109     Magnesium 2.0 mg/dL     POC Glucose Once [[id number]]  (Normal) Collected: 03/07/25 0100    Specimen: Blood Updated: 03/07/25 0105     Glucose 120 mg/dL     Urinalysis With Culture If Indicated - Urine, Clean Catch [[id number]]  (Abnormal) Collected: 03/06/25 2011    Specimen: Urine, Clean Catch Updated: 03/06/25 2045     Color, UA Yellow     Appearance, UA Clear     pH, UA <=5.0     Specific Gravity, UA >1.030     Glucose, UA Negative     Ketones, UA Negative     Bilirubin, UA Negative     Blood, UA Negative     Protein, UA Trace     Leuk Esterase, UA Small (1+)     Nitrite, UA Negative     Urobilinogen, UA 0.2 E.U./dL    Narrative:      In absence of clinical symptoms, the presence of pyuria, bacteria, " and/or nitrites on the urinalysis result does not correlate with infection.    Urinalysis, Microscopic Only - Urine, Clean Catch [[id number]]  (Abnormal) Collected: 03/06/25 2011    Specimen: Urine, Clean Catch Updated: 03/06/25 2045     RBC, UA 0-2 /HPF      WBC, UA 21-50 /HPF      Bacteria, UA None Seen /HPF      Squamous Epithelial Cells, UA 0-2 /HPF      Hyaline Casts, UA None Seen /LPF      Methodology Manual Light Microscopy    Urine Culture - Urine, Urine, Clean Catch [[id number]] Collected: 03/06/25 2011    Specimen: Urine, Clean Catch Updated: 03/06/25 2045    High Sensitivity Troponin T 1Hr [[id number]]  (Abnormal) Collected: 03/06/25 1850    Specimen: Blood from Arm, Left Updated: 03/06/25 1914     HS Troponin T 16 ng/L      Troponin T Numeric Delta -2 ng/L      Troponin T % Delta -11    Narrative:      High Sensitive Troponin T Reference Range:  <14.0 ng/L- Negative Female for AMI  <22.0 ng/L- Negative Male for AMI  >=14 - Abnormal Female indicating possible myocardial injury.  >=22 - Abnormal Male indicating possible myocardial injury.   Clinicians would have to utilize clinical acumen, EKG, Troponin, and serial changes to determine if it is an Acute Myocardial Infarction or myocardial injury due to an underlying chronic condition.         Respiratory Panel PCR w/COVID-19(SARS-CoV-2) RICARDO/ELLIS/DOMINIQUE/PAD/COR/REUBEN In-House, NP Swab in UTM/VTM, 2 HR TAT - Swab, Nasopharynx [[id number]]  (Normal) Collected: 03/06/25 1803    Specimen: Swab from Nasopharynx Updated: 03/06/25 1903     ADENOVIRUS, PCR Not Detected     Coronavirus 229E Not Detected     Coronavirus HKU1 Not Detected     Coronavirus NL63 Not Detected     Coronavirus OC43 Not Detected     COVID19 Not Detected     Human Metapneumovirus Not Detected     Human Rhinovirus/Enterovirus Not Detected     Influenza A PCR Not Detected     Influenza B PCR Not Detected     Parainfluenza Virus 1 Not Detected     Parainfluenza Virus 2 Not Detected     Parainfluenza Virus  3 Not Detected     Parainfluenza Virus 4 Not Detected     RSV, PCR Not Detected     Bordetella pertussis pcr Not Detected     Bordetella parapertussis PCR Not Detected     Chlamydophila pneumoniae PCR Not Detected     Mycoplasma pneumo by PCR Not Detected    Narrative:      In the setting of a positive respiratory panel with a viral infection PLUS a negative procalcitonin without other underlying concern for bacterial infection, consider observing off antibiotics or discontinuation of antibiotics and continue supportive care. If the respiratory panel is positive for atypical bacterial infection (Bordetella pertussis, Chlamydophila pneumoniae, or Mycoplasma pneumoniae), consider antibiotic de-escalation to target atypical bacterial infection.    Comprehensive Metabolic Panel [[id number]]  (Abnormal) Collected: 03/06/25 1757    Specimen: Blood Updated: 03/06/25 1824     Glucose 130 mg/dL      BUN 23 mg/dL      Creatinine 0.82 mg/dL      Sodium 144 mmol/L      Potassium 4.7 mmol/L      Chloride 108 mmol/L      CO2 25.0 mmol/L      Calcium 9.3 mg/dL      Total Protein 6.3 g/dL      Albumin 3.9 g/dL      ALT (SGPT) 44 U/L      AST (SGOT) 40 U/L      Alkaline Phosphatase 87 U/L      Total Bilirubin 0.4 mg/dL      Globulin 2.4 gm/dL      A/G Ratio 1.6 g/dL      BUN/Creatinine Ratio 28.0     Anion Gap 11.0 mmol/L      eGFR 71.1 mL/min/1.73     Narrative:      GFR Categories in Chronic Kidney Disease (CKD)      GFR Category          GFR (mL/min/1.73)    Interpretation  G1                     90 or greater         Normal or high (1)  G2                      60-89                Mild decrease (1)  G3a                   45-59                Mild to moderate decrease  G3b                   30-44                Moderate to severe decrease  G4                    15-29                Severe decrease  G5                    14 or less           Kidney failure          (1)In the absence of evidence of kidney disease, neither GFR  category G1 or G2 fulfill the criteria for CKD.    eGFR calculation 2021 CKD-EPI creatinine equation, which does not include race as a factor    High Sensitivity Troponin T [[id number]]  (Abnormal) Collected: 03/06/25 1757    Specimen: Blood Updated: 03/06/25 1824     HS Troponin T 18 ng/L     Narrative:      High Sensitive Troponin T Reference Range:  <14.0 ng/L- Negative Female for AMI  <22.0 ng/L- Negative Male for AMI  >=14 - Abnormal Female indicating possible myocardial injury.  >=22 - Abnormal Male indicating possible myocardial injury.   Clinicians would have to utilize clinical acumen, EKG, Troponin, and serial changes to determine if it is an Acute Myocardial Infarction or myocardial injury due to an underlying chronic condition.         Magnesium [[id number]]  (Normal) Collected: 03/06/25 1757    Specimen: Blood Updated: 03/06/25 1824     Magnesium 1.9 mg/dL     BNP [[id number]]  (Normal) Collected: 03/06/25 1757    Specimen: Blood Updated: 03/06/25 1823     proBNP 1,099.0 pg/mL     Narrative:      This assay is used as an aid in the diagnosis of individuals suspected of having heart failure. It can be used as an aid in the diagnosis of acute decompensated heart failure (ADHF) in patients presenting with signs and symptoms of ADHF to the emergency department (ED). In addition, NT-proBNP of <300 pg/mL indicates ADHF is not likely.    Age Range Result Interpretation  NT-proBNP Concentration (pg/mL:      <50             Positive            >450                   Gray                 300-450                    Negative             <300    50-75           Positive            >900                  Gray                300-900                  Negative            <300      >75             Positive            >1800                  Gray                300-1800                  Negative            <300    Lactic Acid, Plasma [[id number]]  (Normal) Collected: 03/06/25 1757    Specimen: Blood Updated: 03/06/25 1822      Lactate 1.4 mmol/L     Protime-INR [[id number]]  (Abnormal) Collected: 03/06/25 1757    Specimen: Blood Updated: 03/06/25 1821     Protime 15.9 Seconds      INR 1.25    Narrative:      Suggested INR therapeutic range for stable oral anticoagulant therapy:    Low Intensity therapy:   1.5-2.0  Moderate Intensity therapy:   2.0-3.0  High Intensity therapy:   2.5-4.0    CBC & Differential [[id number]]  (Abnormal) Collected: 03/06/25 1757    Specimen: Blood Updated: 03/06/25 1803    Narrative:      The following orders were created for panel order CBC & Differential.  Procedure                               Abnormality         Status                     ---------                               -----------         ------                     CBC Auto Differential[[id number]]        Abnormal            Final result                 Please view results for these tests on the individual orders.    CBC Auto Differential [[id number]]  (Abnormal) Collected: 03/06/25 1757    Specimen: Blood Updated: 03/06/25 1803     WBC 7.12 10*3/mm3      RBC 4.57 10*6/mm3      Hemoglobin 13.2 g/dL      Hematocrit 42.7 %      MCV 93.4 fL      MCH 28.9 pg      MCHC 30.9 g/dL      RDW 14.3 %      RDW-SD 48.7 fl      MPV 8.9 fL      Platelets 249 10*3/mm3      Neutrophil % 67.0 %      Lymphocyte % 23.3 %      Monocyte % 8.4 %      Eosinophil % 0.7 %      Basophil % 0.3 %      Immature Grans % 0.3 %      Neutrophils, Absolute 4.77 10*3/mm3      Lymphocytes, Absolute 1.66 10*3/mm3      Monocytes, Absolute 0.60 10*3/mm3      Eosinophils, Absolute 0.05 10*3/mm3      Basophils, Absolute 0.02 10*3/mm3      Immature Grans, Absolute 0.02 10*3/mm3      nRBC 0.0 /100 WBC     [place] Draw [[id number]] Collected: 03/06/25 1757    Specimen: Blood Updated: 03/06/25 1801    Narrative:      The following orders were created for panel order [place] Draw.  Procedure                               Abnormality         Status                     ---------                                -----------         ------                     Green Top (Gel)[[id number]]                                  Final result               Lavender Top[[id number]]                                     Final result               Gold Top - SST[[id number]]                                   Final result               Light Blue Top[[id number]]                                   Final result                 Please view results for these tests on the individual orders.    Green Top (Gel) [[id number]] Collected: 03/06/25 1757    Specimen: Blood Updated: 03/06/25 1801     Extra Tube Hold for add-ons.     Comment: Auto resulted.       Lavender Top [[id number]] Collected: 03/06/25 1757    Specimen: Blood Updated: 03/06/25 1801     Extra Tube hold for add-on     Comment: Auto resulted       Gold Top - SST [[id number]] Collected: 03/06/25 1757    Specimen: Blood Updated: 03/06/25 1801     Extra Tube Hold for add-ons.     Comment: Auto resulted.       Light Blue Top [[id number]] Collected: 03/06/25 1757    Specimen: Blood Updated: 03/06/25 1801     Extra Tube Hold for add-ons.     Comment: Auto resulted       Blood Culture - Blood, Arm, Right [[id number]] Collected: 03/06/25 1757    Specimen: Blood from Arm, Right Updated: 03/06/25 1759    Blood Culture - Blood, Arm, Left [[id number]] Collected: 03/06/25 1757    Specimen: Blood from Arm, Left Updated: 03/06/25 1759    Blood Gas, Arterial With Co-Ox [[id number]]  (Abnormal) Collected: 03/06/25 1744    Specimen: Arterial Blood Updated: 03/06/25 1746     Site Right Radial     Indio's Test Positive     pH, Arterial 7.425 pH units      pCO2, Arterial 35.6 mm Hg      pO2, Arterial 67.2 mm Hg      Comment: 84 Value below reference range        HCO3, Arterial 23.3 mmol/L      Base Excess, Arterial -0.7 mmol/L      O2 Saturation, Arterial 92.0 %      Comment: 84 Value below reference range        Hemoglobin, Blood Gas 13.4 g/dL      Comment: 84 Value below reference range        Hematocrit,  Blood Gas 41.0 %      Oxyhemoglobin 91.3 %      Comment: 84 Value below reference range        Methemoglobin 0.40 %      Carboxyhemoglobin 0.4 %      A-a DO2 35.2 mmHg      CO2 Content 24.4 mmol/L      Barometric Pressure for Blood Gas 726 mmHg      Modality Room Air     FIO2 21 %      Ventilator Mode NA     Collected by [id number]     Comment: Meter: [id number]     :  [id number]        pH, Temp Corrected --     pCO2, Temperature Corrected --     pO2, Temperature Corrected --            Imaging Results (Last 24 Hours)       Procedure Component Value Units Date/Time    CT Angiogram Chest Pulmonary Embolism [[id number]] Collected: 03/06/25 1910     Updated: 03/06/25 1918    Narrative:      Comparison: None.     Patient motion artifact limits evaluation.      Subcentimeter left thyroid nodule with calcification. No pulmonary  artery embolism is identified. Heart is enlarged with moderate right  pleural effusion and mild left pleural effusion, correlate for heart  failure. Retrograde enhancement IVC and hepatic veins also seen.  Moderate atelectasis right lower lung with mild atelectasis right middle  lung. Interlobular septal thickening and groundglass appearance favors  pulmonary edema, no pneumothorax is seen. Potential splenic artery  aneurysm, image 133 although partially imaged.       Impression:      Impression:  1. Findings compatible with heart failure.     This report was finalized on 3/6/2025 7:16 PM by Priyank Rasmussen DO.       XR Chest 1 View [[id number]] Collected: 03/06/25 1858     Updated: 03/06/25 1901    Narrative:      PROCEDURE: Portable chest x-ray examination performed on March 6, 2025.  Single view. Upright position.     HISTORY: Shortness of breath.     COMPARISON: None.     FINDINGS:     Enlarged heart size.  Small to medium sized right pleural effusion.  Central pulmonary vascular congestion with edema.  No left pleural effusion. No pneumothorax.  Old healed left posterior fourth rib  fracture.  No acute fracture or dislocation.  No free air in the upper abdomen.  Mild osteoarthritis at the glenohumeral joints.       Impression:         Enlarged heart size  Central pulmonary vascular congestion with edema.  Small to medium size right pleural effusion.  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  No pneumothorax.  Old healed left posterior fourth rib fracture.     This report was finalized on 3/6/2025 6:59 PM by Jayme Bates MD.               LAB RESULTS (LAST 7 DAYS)    CBC  Results from last 7 days   Lab Units 03/07/25 0244 03/06/25 1757   WBC 10*3/mm3 7.17 7.12   RBC 10*6/mm3 4.14 4.57   HEMOGLOBIN g/dL 12.0 13.2   HEMATOCRIT % 38.7 42.7   MCV fL 93.5 93.4   PLATELETS 10*3/mm3 200 249       BMP  Results from last 7 days   Lab Units 03/07/25 0244 03/06/25 1850 03/06/25 1757 03/05/25  1355   SODIUM mmol/L 140  --  144 137   POTASSIUM mmol/L 4.4  --  4.7 4.2   CHLORIDE mmol/L 107  --  108* 105   CO2 mmol/L 24.0  --  25.0 22.2   BUN mg/dL 21  --  23 22   CREATININE mg/dL 0.68  --  0.82 0.68   GLUCOSE mg/dL 119*  --  130* 138*   MAGNESIUM mg/dL  --  2.0 1.9  --        CMP   Results from last 7 days   Lab Units 03/07/25 0244 03/06/25 1757 03/05/25  1355   SODIUM mmol/L 140 144 137   POTASSIUM mmol/L 4.4 4.7 4.2   CHLORIDE mmol/L 107 108* 105   CO2 mmol/L 24.0 25.0 22.2   BUN mg/dL 21 23 22   CREATININE mg/dL 0.68 0.82 0.68   GLUCOSE mg/dL 119* 130* 138*   ALBUMIN g/dL 3.6 3.9  --    BILIRUBIN mg/dL 0.6 0.4  --    ALK PHOS U/L 76 87  --    AST (SGOT) U/L 36* 40*  --    ALT (SGPT) U/L 42* 44*  --        BNP        TROPONIN  Results from last 7 days   Lab Units 03/06/25  1850   HSTROP T ng/L 16*       CoAg  Results from last 7 days   Lab Units 03/06/25  1757   INR  1.25*       Creatinine Clearance  Estimated Creatinine Clearance: 60.6 mL/min (by C-G formula based on SCr of 0.68 mg/dL).    ABG  Results from last 7 days   Lab  Units 03/06/25  1744   PH, ARTERIAL pH units 7.425   PCO2, ARTERIAL mm Hg 35.6   PO2 ART mm Hg 67.2*   O2 SATURATION ART % 92.0*   BASE EXCESS ART mmol/L -0.7*         Radiology  CT Angiogram Chest Pulmonary Embolism    Result Date: 3/6/2025  Impression: 1. Findings compatible with heart failure.  This report was finalized on 3/6/2025 7:16 PM by Priyank Rasmussen DO.      XR Chest 1 View    Result Date: 3/6/2025   Enlarged heart size Central pulmonary vascular congestion with edema. Small to medium size right pleural effusion. Mild compressive atelectasis at the right lung base Degenerative disc disease throughout the thoracic spine. No pneumothorax. Old healed left posterior fourth rib fracture.  This report was finalized on 3/6/2025 6:59 PM by Jayme Bates MD.      XR Chest 2 View    Result Date: 3/5/2025  Small right pleural effusion and right basilar consolidation   This report was finalized on 3/5/2025 2:26 PM by Dr. Esvin Quan MD.         EKG  I personally viewed and interpreted the patient's EKG/Telemetry data:  ECG 12 Lead Tachycardia   Preliminary Result   Test Reason : Tachycardia   Blood Pressure :   */*   mmHG   Vent. Rate :  70 BPM     Atrial Rate : 129 BPM      P-R Int :   * ms          QRS Dur :  80 ms       QT Int : 408 ms       P-R-T Axes :   *  59  55 degrees     QTcB Int : 440 ms      Atrial fibrillation   Anteroseptal infarct (cited on or before 21-Feb-2025)   Abnormal ECG   When compared with ECG of 07-Mar-2025 01:21, (Unconfirmed)   Serial changes of Anteroseptal infarct present      Referred By: NATHALIE           Confirmed By:       ECG 12 Lead Tachycardia   Preliminary Result   Test Reason : Tachycardia   Blood Pressure :   */*   mmHG   Vent. Rate :  83 BPM     Atrial Rate :  93 BPM      P-R Int :   * ms          QRS Dur :  72 ms       QT Int : 328 ms       P-R-T Axes :   *  91  97 degrees     QTcB Int : 385 ms      Atrial fibrillation   Low voltage QRS   Anterolateral infarct (cited on or  before 21-Feb-2025)   Abnormal ECG   When compared with ECG of 06-Mar-2025 17:08, (Unconfirmed)   Vent. rate has decreased by  65 bpm   Questionable change in initial forces of Lateral leads      Referred By:            Confirmed By:       ECG 12 Lead Dyspnea   Preliminary Result   Test Reason : Dyspnea   Blood Pressure :   */*   mmHG   Vent. Rate : 148 BPM     Atrial Rate : 120 BPM      P-R Int :   * ms          QRS Dur :  72 ms       QT Int : 300 ms       P-R-T Axes :   *  95 -76 degrees     QTcB Int : 471 ms      ** Critical Test Result: High HR   Atrial fibrillation with rapid ventricular response   Rightward axis   Low voltage QRS   Cannot rule out Anteroseptal infarct (cited on or before 21-Feb-2025)   Abnormal ECG   When compared with ECG of 21-Feb-2025 11:57, (Unconfirmed)   T wave inversion less evident in Anterior leads      Referred By: DOLLY           Confirmed By:       Telemetry Scan   Final Result      Telemetry Scan   Final Result      Telemetry Scan   Final Result            Echocardiogram:    Results for orders placed during the hospital encounter of 11/22/24    Adult Transthoracic Echo Complete W/ Cont if Necessary Per Protocol    Interpretation Summary    Left ventricular systolic function is normal. Left ventricular ejection fraction appears to be 66 - 70%.    The left ventricular cavity is mildly dilated.    Left ventricular diastolic function is consistent with (grade Ia w/high LAP) impaired relaxation.    The left atrial cavity is moderately dilated.    The right atrial cavity is mildly  dilated.    Mild aortic valve stenosis is present.    Moderate to severe mitral valve regurgitation is present.    Estimated right ventricular systolic pressure from tricuspid regurgitation is normal (<35 mmHg).    Echogenic consistency of myocardium consistent with possible infiltrative process, consider cardiac amyloidosis.        Stress Test:  Results for orders placed during the hospital encounter of  02/21/25    Stress Test With Myocardial Perfusion (1 Day)    Interpretation Summary  Images from the original result were not included.      A pharmacological stress test was performed using regadenoson without low-level exercise.    Findings consistent with an indeterminate ECG stress test.    Myocardial perfusion imaging indicates a normal myocardial perfusion study with no evidence of ischemia.    TID:1.15    Abnormal LV wall motion consistent with moderate global hypokinesis.    Left ventricular ejection fraction is moderately reduced (Calculated EF = 42%).    Impressions are consistent with a low risk study.        Cardiac Catheterization:  No results found for this or any previous visit.        Other:      ASSESSMENT & PLAN:    Principal Problem:    Atrial fibrillation with RVR    Atrial fibrillation with rapid ventricular rate  TSH is normal  [id number] score is 6  Continue Eliquis for anticoagulation  Patient and family have declined YARIEL and cardioversion until pneumonia has been completely treated.  Unlikely to maintain sinus rhythm due to structural abnormalities in the heart  Currently on Cardizem drip: Wean off as tolerated  Start Toprol-XL  Avoid Cardizem in the setting of volume overload     Mitral regurgitation  Previous echocardiogram showed at least moderate mitral regurgitation  MR appears to be atrial driven due to significant enlargement of left atrium  Continue diuretics    Chronic HFpEF  HFpEF secondary to hypertension, mitral regurgitation, atrial fibrillation.  Previous echocardiogram showed preserved LV function with grade 1 diastolic dysfunction.  Patient with bilateral lower extremity edema  Normal proBNP  CT chest suggestive of fluid overload  Will give a dose of IV Lasix today    Hypoxemic respiratory  Combination of pneumonia, fluid overload  Currently on 2 L of oxygen   Continue antibiotics  Continue diuretic    Hypertension  Continue losartan    Diabetes  Typically on  metformin  Insulin sliding scale during inpatient stay  A1c 6.8    Alexander Navas MD  03/07/25  08:56 EST

## 2025-03-07 NOTE — NURSING NOTE
Pt being discharged home today on room air. ELISABETH Biggs, made aware the discharge is complete. Family to transport.

## 2025-03-08 LAB — BACTERIA SPEC AEROBE CULT: NO GROWTH

## 2025-03-10 ENCOUNTER — TELEPHONE (OUTPATIENT)
Dept: TELEMETRY | Facility: HOSPITAL | Age: 85
End: 2025-03-10
Payer: MEDICARE

## 2025-03-10 ENCOUNTER — TELEPHONE (OUTPATIENT)
Dept: CARDIOLOGY | Facility: CLINIC | Age: 85
End: 2025-03-10
Payer: MEDICARE

## 2025-03-10 ENCOUNTER — NURSE TRIAGE (OUTPATIENT)
Dept: CALL CENTER | Facility: HOSPITAL | Age: 85
End: 2025-03-10
Payer: MEDICARE

## 2025-03-10 NOTE — TELEPHONE ENCOUNTER
They didn't have cardioversion done inpatient. He wanted to know if she actually had pneumonia due to being treated for in the ER but once she got admitted they didn't treat her for it anymore.

## 2025-03-10 NOTE — TELEPHONE ENCOUNTER
Pt was discharged from the hospital on Friday son is wanting to know if she had pneumonia  she is still having trouble breathing and is asking about cardio version

## 2025-03-10 NOTE — CASE MANAGEMENT/SOCIAL WORK
Discharge Planning Assessment   Luis Manuel     Patient Name: Suellen Matamoros  MRN: [id number]  Today's Date: 3/10/2025    Admit Date: 3/6/2025       Discharge Plan       Row Name 03/10/25 0849       Plan    Final Discharge Disposition Code 01 - home or self-care    Final Note Pt discharged home over weekend.                    Expected Discharge Date and Time       Expected Discharge Date Expected Discharge Time    Mar 7, 2025           ALEK Pedro

## 2025-03-10 NOTE — TELEPHONE ENCOUNTER
"Advised I cannot comment to her other diagnosis, Afib was her d/c diagnosis and what shows on her AVS, due to HIPAA I cannot comment any further. Directed to  medical records.   Reason for Disposition  • [1] Caller requesting NON-URGENT health information AND [2] PCP's office is the best resource    Additional Information  • Negative: [1] Caller is not with the adult (patient) AND [2] reporting urgent symptoms  • Negative: Lab result questions  • Negative: Medication questions  • Negative: Caller can't be reached by phone  • Negative: Caller has already spoken to PCP or another triager  • Negative: RN needs further essential information from caller in order to complete triage  • Negative: Requesting regular office appointment    Answer Assessment - Initial Assessment Questions  1. REASON FOR CALL or QUESTION: \"What is your reason for calling today?\" or \"How can I best help you?\" or \"What question do you have that I can help answer?\"      Asking for all of her admission diagnosis, unable to share this information    Protocols used: Information Only Call - No Triage-ADULT-    "

## 2025-03-11 ENCOUNTER — NURSE TRIAGE (OUTPATIENT)
Dept: CALL CENTER | Facility: HOSPITAL | Age: 85
End: 2025-03-11
Payer: MEDICARE

## 2025-03-11 LAB
BACTERIA SPEC AEROBE CULT: NORMAL
BACTERIA SPEC AEROBE CULT: NORMAL

## 2025-03-11 NOTE — TELEPHONE ENCOUNTER
Caller: YANG LOPEZ    Relationship: Emergency Contact    Best call back number: [phone]    What is the best time to reach you: ANY    Who are you requesting to speak with (clinical staff, provider,  specific staff member): CLINICAL    Do you know the name of the person who called: N/A    What was the call regarding: PATIENTS SON YANG LOPEZ  CALLED STATING PATIENT IS READY TO HAVE CARDIOVERSION DONE. FAMILY THOUGHT THE PATIENT HAD PNEUMONIA AND IS WHY SHE DIDN'T WANT TO HAVE IT DONE, BUT DOES NOT HAVE PNEUMONIA AND IS READY TO SCHEDULE.    Is it okay if the provider responds through MyChart: Call

## 2025-03-11 NOTE — TELEPHONE ENCOUNTER
"Son is concerned that her BP is too low after starting metoprolol. Her most recent BP was 95/59. She does not feel any different. He said that he gave her salt to raise her BP.   Triage compelted and I advised him to have her seen by here PCP within 24 hours. I explained that she may need a medication adjustment. He asked if he could do that now. I said that he needs to talk to her provider before making adjustment. He asked how much salt she should increase to increase her BP. I told him that he should discuss that with her PCP if that is what they wouldl mikey her to do. He asked he should take her to the ED. I explained if her Systolic BP was less than 80 or if she was symptomatic. (Dizzy, weak, lightheaded). He said that he will follow this advice.   Reason for Disposition   [1] Systolic BP  AND [2] taking blood pressure medications AND [3] NOT dizzy, lightheaded or weak    Additional Information   Negative: Started suddenly after an allergic medicine, an allergic food, or bee sting   Negative: Shock suspected (e.g., cold/pale/clammy skin, too weak to stand, low BP, rapid pulse)   Negative: Difficult to awaken or acting confused (e.g., disoriented, slurred speech)   Negative: Fainted   Negative: [1] Systolic BP < 90 AND [2] dizzy, lightheaded, or weak   Negative: Chest pain   Negative: Bleeding (e.g., vomiting blood, rectal bleeding or tarry stools, severe vaginal bleeding)(Exception: fainted from sight of small amount of blood; small cut or abrasion)   Negative: Extra heart beats or heart is beating fast  (i.e., \"palpitations\")   Negative: Sounds like a life-threatening emergency to the triager   Negative: [1] Systolic BP < 80 AND [2] NOT dizzy, lightheaded or weak   Negative: Abdominal pain   Negative: Fever > 100.4 F (38.0 C)   Negative: Major surgery in the past month   Negative: [1] Drinking very little AND [2] dehydration suspected (e.g., no urine > 12 hours, very dry mouth, very lightheaded)   " "Negative: [1] Fall in systolic BP > 20 mm Hg from normal AND [2] dizzy, lightheaded, or weak   Negative: Patient sounds very sick or weak to the triager   Negative: [1] Systolic BP < 90 AND [2] NOT dizzy, lightheaded or weak   Negative: [1] Systolic BP  AND [2] taking blood pressure medications AND [3] dizzy, lightheaded or weak    Answer Assessment - Initial Assessment Questions  1. BLOOD PRESSURE: \"What is the blood pressure?\" \"Did you take at least two measurements 5 minutes apart?\"      90/60 99/46 1242  95/59  2. ONSET: \"When did you take your blood pressure?\"      Now   3. HOW: \"How did you obtain the blood pressure?\" (e.g., visiting nurse, automatic home BP monitor)      Automatic arm cuff   4. HISTORY: \"Do you have a history of low blood pressure?\" \"What is your blood pressure normally?\"      No, started on metoprolol   5. MEDICATIONS: \"Are you taking any medications for blood pressure?\" If yes: \"Have they been changed recently?\"      Just started metoprolol and losartan   6. PULSE RATE: \"Do you know what your pulse rate is?\"       85  7. OTHER SYMPTOMS: \"Have you been sick recently?\" \"Have you had a recent injury?\"      No   8. PREGNANCY: \"Is there any chance you are pregnant?\" \"When was your last menstrual period?\"      NA    Protocols used: Low Blood Pressure-ADULT-AH    "

## 2025-03-11 NOTE — TELEPHONE ENCOUNTER
Looks like patient was hospitalized for acute heart failure.  During the hospitalization we tried to cardiovert her but she refused.

## 2025-03-12 ENCOUNTER — TRANSCRIBE ORDERS (OUTPATIENT)
Dept: ADMINISTRATIVE | Facility: HOSPITAL | Age: 85
End: 2025-03-12
Payer: MEDICARE

## 2025-03-12 DIAGNOSIS — E04.1 NONTOXIC SINGLE THYROID NODULE: Primary | ICD-10-CM

## 2025-03-13 NOTE — TELEPHONE ENCOUNTER
HUB TO RELAY    I want her seen by the heart failure clinic prior to proceeding with cardioversion. If she still retaining fluid chance of success with cardioversion dropped significantly.     Called, no answer, left VM.

## 2025-03-13 NOTE — TELEPHONE ENCOUNTER
I want her seen by the heart failure clinic prior to proceeding with cardioversion.  If she still retaining fluid chance of success with cardioversion dropped significantly.

## 2025-03-17 ENCOUNTER — HOSPITAL ENCOUNTER (OUTPATIENT)
Dept: CARDIOLOGY | Facility: HOSPITAL | Age: 85
Discharge: HOME | End: 2025-03-17
Admitting: PHYSICIAN ASSISTANT
Payer: MEDICARE

## 2025-03-17 VITALS
SYSTOLIC BLOOD PRESSURE: 104 MMHG | BODY MASS INDEX: 26.17 KG/M2 | OXYGEN SATURATION: 96 % | HEART RATE: 83 BPM | HEIGHT: 62 IN | DIASTOLIC BLOOD PRESSURE: 72 MMHG | WEIGHT: 142.2 LBS

## 2025-03-17 DIAGNOSIS — I50.32 CHRONIC HEART FAILURE WITH PRESERVED EJECTION FRACTION (HFPEF): Primary | ICD-10-CM

## 2025-03-17 DIAGNOSIS — I48.19 PERSISTENT ATRIAL FIBRILLATION: ICD-10-CM

## 2025-03-17 DIAGNOSIS — I34.0 MITRAL VALVE INSUFFICIENCY, UNSPECIFIED ETIOLOGY: ICD-10-CM

## 2025-03-17 DIAGNOSIS — I48.19 PERSISTENT ATRIAL FIBRILLATION: Primary | ICD-10-CM

## 2025-03-17 LAB
ABSOLUTE LUNG FLUID CONTENT: 37 % (ref 20–35)
ANION GAP SERPL CALCULATED.3IONS-SCNC: 10.5 MMOL/L (ref 5–15)
BUN SERPL-MCNC: 21 MG/DL (ref 8–23)
BUN/CREAT SERPL: 26.3 (ref 7–25)
CALCIUM SPEC-SCNC: 9 MG/DL (ref 8.6–10.5)
CHLORIDE SERPL-SCNC: 104 MMOL/L (ref 98–107)
CO2 SERPL-SCNC: 27.5 MMOL/L (ref 22–29)
CREAT SERPL-MCNC: 0.8 MG/DL (ref 0.57–1)
EGFRCR SERPLBLD CKD-EPI 2021: 73.2 ML/MIN/1.73
GLUCOSE SERPL-MCNC: 109 MG/DL (ref 65–99)
MAGNESIUM SERPL-MCNC: 2.1 MG/DL (ref 1.6–2.4)
NT-PROBNP SERPL-MCNC: 840.1 PG/ML (ref 0–1800)
POTASSIUM SERPL-SCNC: 4.4 MMOL/L (ref 3.5–5.2)
SODIUM SERPL-SCNC: 142 MMOL/L (ref 136–145)

## 2025-03-17 PROCEDURE — 80048 BASIC METABOLIC PNL TOTAL CA: CPT | Performed by: PHYSICIAN ASSISTANT

## 2025-03-17 PROCEDURE — 83880 ASSAY OF NATRIURETIC PEPTIDE: CPT | Performed by: PHYSICIAN ASSISTANT

## 2025-03-17 PROCEDURE — 83735 ASSAY OF MAGNESIUM: CPT | Performed by: PHYSICIAN ASSISTANT

## 2025-03-17 PROCEDURE — 94726 PLETHYSMOGRAPHY LUNG VOLUMES: CPT | Performed by: PHYSICIAN ASSISTANT

## 2025-03-17 PROCEDURE — 36415 COLL VENOUS BLD VENIPUNCTURE: CPT | Performed by: PHYSICIAN ASSISTANT

## 2025-03-17 NOTE — PROGRESS NOTES
Heart Failure Clinic  Pharmacist Note     Suellen Matamoros is a 84 y.o. female seen in the Heart Failure Clinic for HFpEF. The last ejection fraction was EF 66-70% from 11/22/24. She was recently admitted from 3/6/25-3/10/25 for SOB and A Fib. Upon discharge, Lasix 40 mg daily and Toprol 25 mg were started.     Suellen Matamoros reports a good understanding of medications. Her son is with her in clinic today and he helps her with her medications. Patient reports increasing salt in order to increase blood pressure. Patient reports taking Lasix 40 mg daily. Patient does check blood pressure at home and it runs low. Readings over the last week were approximately /50-70. Her blood pressure in clinic today was 104/72.      Medication Use:   Hx of med intolerances: None related to HF  Retail Rx Management: [facility] in [place]    Past Medical History:   Diagnosis Date    Macular degeneration      ALLERGIES: Patient has no known allergies.  Current Outpatient Medications   Medication Sig Dispense Refill    apixaban (ELIQUIS) 5 MG tablet tablet Take 1 tablet by mouth 2 (Two) Times a Day. 60 tablet 2    Ascorbic Acid (VITAMIN C PO) Take 250 mg by mouth Daily.      Barberry-Oreg Grape-Goldenseal (BERBERINE COMPLEX PO) Take 1 capsule by mouth Daily.      Calcium Carb-Cholecalciferol (CALCIUM 500+D PO) Take 1 capsule by mouth Daily.      Cholecalciferol (VITAMIN D-3 PO) Take 400 Units by mouth Daily.      furosemide (Lasix) 40 MG tablet Take 1 tablet by mouth Daily. 30 tablet 1    metFORMIN ER (GLUCOPHAGE-XR) 500 MG 24 hr tablet Take 1 tablet by mouth Daily With Breakfast.      metoprolol succinate XL (TOPROL-XL) 25 MG 24 hr tablet Take 1 tablet by mouth Every 12 (Twelve) Hours. (Patient taking differently: Take 1 tablet by mouth Daily.) 30 tablet 1    Multiple Vitamins-Minerals (ICAPS AREDS 2 PO) Take 1 capsule by mouth Daily.      multivitamin (MULTI VITAMIN PO) Take 1 tablet by mouth Daily.      multivitamin with  "minerals (VISION FORMULA PO) Take 1 tablet by mouth Daily.      Omega-3 Fatty Acids (OMEGA 3 PO) Take 1,000 Units by mouth Daily.      zinc sulfate (ZINCATE) 220 (50 Zn) MG capsule Take 1 capsule by mouth Daily.       No current facility-administered medications for this encounter.       Vaccination History:   Pneumonia: not interested   Annual Influenza: not interested   Shingles: not interested     Objective  Vitals:    03/17/25 1415   BP: 104/72   BP Location: Left arm   Patient Position: Sitting   Cuff Size: Adult   Pulse: 83   SpO2: 96%   Weight: 64.5 kg (142 lb 3.2 oz)   Height: 157.5 cm (62\")     Wt Readings from Last 3 Encounters:   03/17/25 64.5 kg (142 lb 3.2 oz)   03/07/25 77.9 kg (171 lb 12.8 oz)   02/25/25 68.7 kg (151 lb 6.4 oz)         03/17/25  1415   Weight: 64.5 kg (142 lb 3.2 oz)     Lab Results   Component Value Date    GLUCOSE 109 (H) 03/17/2025    BUN 21 03/17/2025    CREATININE 0.80 03/17/2025    BCR 26.3 (H) 03/17/2025    K 4.4 03/17/2025    CO2 27.5 03/17/2025    CALCIUM 9.0 03/17/2025    ALBUMIN 3.6 03/07/2025    AST 36 (H) 03/07/2025    ALT 42 (H) 03/07/2025     Lab Results   Component Value Date    WBC 7.17 03/07/2025    HGB 12.0 03/07/2025    HCT 38.7 03/07/2025    MCV 93.5 03/07/2025     03/07/2025     Lab Results   Component Value Date    TROPONINT 16 (H) 03/06/2025     Lab Results   Component Value Date    PROBNP 1,099.0 03/06/2025     Results for orders placed during the hospital encounter of 11/22/24    Adult Transthoracic Echo Complete W/ Cont if Necessary Per Protocol    Interpretation Summary    Left ventricular systolic function is normal. Left ventricular ejection fraction appears to be 66 - 70%.    The left ventricular cavity is mildly dilated.    Left ventricular diastolic function is consistent with (grade Ia w/high LAP) impaired relaxation.    The left atrial cavity is moderately dilated.    The right atrial cavity is mildly  dilated.    Mild aortic valve stenosis is " present.    Moderate to severe mitral valve regurgitation is present.    Estimated right ventricular systolic pressure from tricuspid regurgitation is normal (<35 mmHg).    Echogenic consistency of myocardium consistent with possible infiltrative process, consider cardiac amyloidosis.         GDMT     Drug Class   Drug   Dose Last Dose Adjustment Additional Titration   Notes   ACEi/ARB/ARNI      Beta Blocker Toprol 25mg      MRA        SGLT2i    N/A    Lasix 40mg daily    Drug Therapy Problems    1. Drug Interactions Screening  2. Drug-Disease Interactions:   3. Increasing salt intake & hypotension    Recommendations:     No significant interactions according to literature.   2.  Patient counseled that NSAIDs are generally not recommended in HF. If pain relief is required, Tylenol is preferred.   3. Patient counseled to stop increasing salt intake in order to increase blood pressure. Toyin to stop losartan.       Patient was educated on heart failure medications and the importance of medication adherence. All questions were addressed and patient expressed good understanding.   Thank you for allowing me to participate in the care of your patient,    Mechelle Stephens, [facility]  03/17/25  15:57 EDT

## 2025-03-17 NOTE — PROGRESS NOTES
Heart Failure Clinic    Date: 03/17/25     Vitals:    03/17/25 1415   BP: 104/72   Pulse: 83   SpO2: 96%    Weight 142    Method of arrival: Ambulatory    Weighing self daily: No    Monitoring Heart Failure Zones: No    Today's HF Zone: Yellow     Taking medications as prescribed: Yes    Edema No    Shortness of Air: Yes with activity    Number of pillows used at night:<2    Educational Materials given:  AVS and heart success booklet                                                                         ReDS Value: 37  36-41 Possible Hypervolemic Status      Barbara Cao MA 03/17/25 14:16 EDT

## 2025-03-17 NOTE — PROGRESS NOTES
[facility]  LEONARDO Almaguer Devin L, PA-C  [address] [place] [address]    Thank you for asking me to see Suellen Matamoros for congestive heart failure.    HPI:     This is a 84 y.o. female with known past medical history of:    Chronic HFpEF  TTE from 11/22/24 with EF 66-70%, grade 1a diastolic dysfunction.    Atrial fibrillation  Moderate to severe mitral valve regurgitation  Essential hypertension  Type 2 DM     Suellen Matamoros presents for today for Heart Failure evaluation.  The patient is typically seen by Janie Reid PA.  Patient's primary cardiologist is Dr. Asif.     Last known EF 66-70%.   Last known hospitalization and/or ED visit: Hospitalized from 03/06/25 through 03/07/25 with Afib with RVR and acute hypoxemic respiratory failure.   Accompanied by: Son on 1st visit.           03/17/2025 visit data/details regarding:   Dyspnea: Dyspnea with prolonged exertion  Lower extremity swelling: Improving  Abdominal swelling: Denies  Home weight: Weight monitoring booklet provided during initial visit; Has BP cuff.   Home BP: BP monitoring booklet provided during initial visit; Has scale.   Home heart rate: HR monitoring booklet provided during initial visit  Daily activities of living: Performing on her own  Pillows/lying flat: 2 pillows in bed   HF zone: Green/Yellow  Mrs. Matamoros feels improved since hospitalization. She reports swelling of extremities and shortness of breath have improved.    Home blood pressures reviewed with several in the 90s systolic and some dropping lower. As such, we discuss stopping Losartan but continuing Toprol XL for rate control.           Review of Systems - Review of Systems   Constitutional: Negative for decreased appetite and diaphoresis.   HENT:  Negative for congestion and ear pain.    Eyes:  Negative for blurred vision and double vision.   Cardiovascular:  Positive for dyspnea on exertion. Negative for leg  swelling.   Respiratory:  Positive for shortness of breath.    Gastrointestinal:  Negative for bloating and anorexia.   Genitourinary:  Negative for bladder incontinence and dysuria.         All other systems were reviewed and were negative.    Patient Active Problem List   Diagnosis    Borderline hypertension    Premature atrial beats    Abnormal EKG    Persistent atrial fibrillation with a [id number] score of 5       family history includes Pulmonary embolism in her mother.     reports that she has never smoked. She has never used smokeless tobacco. She reports that she does not drink alcohol and does not use drugs.    No Known Allergies      Current Outpatient Medications:     apixaban (ELIQUIS) 5 MG tablet tablet, Take 1 tablet by mouth 2 (Two) Times a Day., Disp: 60 tablet, Rfl: 2    Ascorbic Acid (VITAMIN C PO), Take 250 mg by mouth Daily., Disp: , Rfl:     Barberry-Oreg Grape-Goldenseal (BERBERINE COMPLEX PO), Take 1 capsule by mouth Daily., Disp: , Rfl:     Calcium Carb-Cholecalciferol (CALCIUM 500+D PO), Take 1 capsule by mouth Daily., Disp: , Rfl:     Cholecalciferol (VITAMIN D-3 PO), Take 400 Units by mouth Daily., Disp: , Rfl:     furosemide (Lasix) 40 MG tablet, Take 1 tablet by mouth Daily., Disp: 30 tablet, Rfl: 1    metFORMIN ER (GLUCOPHAGE-XR) 500 MG 24 hr tablet, Take 1 tablet by mouth Daily With Breakfast., Disp: , Rfl:     metoprolol succinate XL (TOPROL-XL) 25 MG 24 hr tablet, Take 1 tablet by mouth Every 12 (Twelve) Hours. (Patient taking differently: Take 1 tablet by mouth Daily.), Disp: 30 tablet, Rfl: 1    Multiple Vitamins-Minerals (ICAPS AREDS 2 PO), Take 1 capsule by mouth Daily., Disp: , Rfl:     multivitamin (MULTI VITAMIN PO), Take 1 tablet by mouth Daily., Disp: , Rfl:     multivitamin with minerals (VISION FORMULA PO), Take 1 tablet by mouth Daily., Disp: , Rfl:     Omega-3 Fatty Acids (OMEGA 3 PO), Take 1,000 Units by mouth Daily., Disp: , Rfl:     zinc sulfate (ZINCATE) 220 (50  Zn) MG capsule, Take 1 capsule by mouth Daily., Disp: , Rfl:       Physical Exam:  I have reviewed the patient's current vital signs as documented in the patient's EMR.   Vitals:    03/17/25 1415   BP: 104/72   Pulse: 83   SpO2: 96%     Body mass index is 26.01 kg/m².       03/17/25  1415   Weight: 64.5 kg (142 lb 3.2 oz)      Physical Exam  Vitals and nursing note reviewed.   Constitutional:       General: She is awake.      Appearance: Normal appearance. She is well-developed and well-groomed.   HENT:      Head: Normocephalic and atraumatic.   Eyes:      General: Lids are normal.   Cardiovascular:      Rate and Rhythm: Normal rate. Rhythm irregularly irregular.      Heart sounds: Murmur heard.   Abdominal:      General: Bowel sounds are normal.      Palpations: Abdomen is soft.      Tenderness: There is no abdominal tenderness.   Neurological:      Mental Status: She is alert and oriented to person, place, and time.   Psychiatric:         Attention and Perception: Attention normal.         Mood and Affect: Mood normal.         Behavior: Behavior is cooperative.          JVP: Volume/Pulsation: Normal.        DATA REVIEWED:       ---------------------------------------------------  TTE/YARIEL:  Results for orders placed during the hospital encounter of 11/22/24    Adult Transthoracic Echo Complete W/ Cont if Necessary Per Protocol    Interpretation Summary    Left ventricular systolic function is normal. Left ventricular ejection fraction appears to be 66 - 70%.    The left ventricular cavity is mildly dilated.    Left ventricular diastolic function is consistent with (grade Ia w/high LAP) impaired relaxation.    The left atrial cavity is moderately dilated.    The right atrial cavity is mildly  dilated.    Mild aortic valve stenosis is present.    Moderate to severe mitral valve regurgitation is present.    Estimated right ventricular systolic pressure from tricuspid regurgitation is normal (<35 mmHg).    Echogenic  consistency of myocardium consistent with possible infiltrative process, consider cardiac amyloidosis.        LAST HEART CATH RESULT/IF AVAILABLE:     No results found for this or any previous visit.      -----------------------------------------------------  CXR/Imaging:   Imaging Results (Most Recent)       None            I personally reviewed and interpreted the CXR.      -----------------------------------------------------  CT:   CT Angiogram Chest Pulmonary Embolism  Result Date: 3/6/2025  Impression: 1. Findings compatible with heart failure.  This report was finalized on 3/6/2025 7:16 PM by Priyank Rasmussen DO.      XR Chest 1 View  Result Date: 3/6/2025   Enlarged heart size Central pulmonary vascular congestion with edema. Small to medium size right pleural effusion. Mild compressive atelectasis at the right lung base Degenerative disc disease throughout the thoracic spine. No pneumothorax. Old healed left posterior fourth rib fracture.  This report was finalized on 3/6/2025 6:59 PM by Jayme Bates MD.      XR Chest 2 View  Result Date: 3/5/2025  Small right pleural effusion and right basilar consolidation   This report was finalized on 3/5/2025 2:26 PM by Dr. Esvin Quan MD.      CT Head Without Contrast  Result Date: 2/21/2025  1.  No CT evidence of acute intracranial abnormality. 2.  Minimal left supraorbital region soft tissue swelling.  This report was finalized on 2/21/2025 2:49 PM by Dr. Josiah Traore MD.      I personally reviewed the images of the CT scan.  My personal interpretation is below.      ----------------------------------------------------      --------------------------------------------------------------------------------------------------    Laboratory evaluations:    Lab Results   Component Value Date    GLUCOSE 109 (H) 03/17/2025    BUN 21 03/17/2025    CREATININE 0.80 03/17/2025    BCR 26.3 (H) 03/17/2025    K 4.4 03/17/2025    CO2 27.5 03/17/2025    CALCIUM 9.0 03/17/2025     "ALBUMIN 3.6 03/07/2025    AST 36 (H) 03/07/2025    ALT 42 (H) 03/07/2025     Lab Results   Component Value Date    WBC 7.17 03/07/2025    HGB 12.0 03/07/2025    HCT 38.7 03/07/2025    MCV 93.5 03/07/2025     03/07/2025     No results found for: \"CHOL\", \"CHLPL\", \"TRIG\", \"HDL\", \"LDL\", \"LDLDIRECT\"  Lab Results   Component Value Date    TSH 1.230 03/06/2025     Lab Results   Component Value Date    HGBA1C 6.80 (H) 03/07/2025     Lab Results   Component Value Date    ALT 42 (H) 03/07/2025     Lab Results   Component Value Date    HGBA1C 6.80 (H) 03/07/2025     Lab Results   Component Value Date    CREATININE 0.80 03/17/2025     No results found for: \"IRON\", \"TIBC\", \"FERRITIN\"  Lab Results   Component Value Date    INR 1.25 (H) 03/06/2025    PROTIME 15.9 (H) 03/06/2025        Lab Results   Component Value Date    ABSOLUTELUNG 37 (A) 03/17/2025    ABSOLUTELUNG 38 (A) 03/07/2025           1. Chronic heart failure with preserved ejection fraction (HFpEF)    2. Mitral valve insufficiency, unspecified etiology    3. Persistent atrial fibrillation with a [id number] score of 5          ORDERS PLACED TODAY:  Orders Placed This Encounter   Procedures    ReDs Vest    Basic Metabolic Panel    Magnesium    proBNP    Basic Metabolic Panel    Magnesium    proBNP        Diagnoses and all orders for this visit:    1. Chronic heart failure with preserved ejection fraction (HFpEF) (Primary)  -     Basic Metabolic Panel; Future  -     Magnesium; Future  -     proBNP; Future  -     Basic Metabolic Panel; Standing  -     Basic Metabolic Panel  -     Magnesium; Standing  -     Magnesium  -     proBNP; Standing  -     proBNP  -     ReDs Vest    2. Mitral valve insufficiency, unspecified etiology    3. Persistent atrial fibrillation with a [id number]-MGTr score of 5             MEDS ORDERED TODAY:    No orders of the defined types were placed in this encounter.   "     ---------------------------------------------------------------------------------------------------------------------------          ASSESSMENT/PLAN:      Diagnosis Plan   1. Chronic heart failure with preserved ejection fraction (HFpEF)  Basic Metabolic Panel    Magnesium    proBNP    Basic Metabolic Panel    Basic Metabolic Panel    Magnesium    Magnesium    proBNP    proBNP    ReDs Vest      2. Mitral valve insufficiency, unspecified etiology        3. Persistent atrial fibrillation with a [id number] score of 5            not acutely decompensated chronic diastolic heart failure. CHF.     CHF GOAL DIRECTED MEDICAL THERAPY FOR PATIENT ADDRESSED/ADJUSTED:     GDMT: HFpEF    Drug Class   Drug   Dose Last Dose Adjustment Notes   ACEi/ARB/ARNI Losartan 25mg     Beta Blocker Toprol XL  25mg      MRA       SGLT2i    N/A   Secondaries if applicable:          -CHF Specific BB:    Metoprolol Succinate  at dose of 25mg qd.   We discussed processes/benefits of HF clinic including nursing, pharmacist, and provider evaluation during each visit with ability for in office ReDS vest, labs, and ability to provide IV diuresis in the clinic with close outpatient monitoring.  Additionally, patient was educated about the availability of delivery of medications to patient's clinic room prior to leaving the building which assists with medication compliance and insures medications are in hands when changes are made (if patient opts for apothecary usage) with thorough guidance regarding changes and medication schedule provided.          -ACE/ARB/ARNi:   Stop Losartan 2/2 lower blood pressures at home.     -Diuretic regimen:   ReDS Vest reading for. 03/17/25 is  37; ReDs Vest reading reviewed with patient.    Continue Lasix 40mg qd.    BMP, Mag, & ProBNP reviewed with patient.  ProBNP is WNL.  Creatinine is stable.      -Fluid restriction/Sodium restriction:   Requested 2000 ml restriction  Patient has been asked to weigh daily and  was provided with a printed diuretic strategy.  1,500 mg Na restriction was discussed.        -Acute and/or Chronic underlying conditions other than HF addressed during visit:   Essential HTN:   BPs have been lower.  Stop Losartan.      Paroxysmal atrial fibrillation:   Continue Toprol XL 25mg qd.     Identifiable barriers to Heart Failure Self-care:   Medical Barriers:  Vision difficulties with macular degeneration  Social Barriers:  No immediate known social barriers.     ---------------------------------------------------------              This document has been electronically signed by Toyin Nichole PA-C  March 17, 2025 16:08 EDT      Dictated Utilizing Dragon Dictation: Part of this note may be an electronic transcription/translation of spoken language to printed text using the Dragon Dictation System.    Follow-up appointment and medication changes provided in hand delivered After Visit Summary as well as reviewed in the room.

## 2025-03-18 ENCOUNTER — TELEPHONE (OUTPATIENT)
Dept: CARDIOLOGY | Facility: CLINIC | Age: 85
End: 2025-03-18
Payer: MEDICARE

## 2025-03-19 ENCOUNTER — CONSULT (OUTPATIENT)
Dept: CARDIOLOGY | Facility: CLINIC | Age: 85
End: 2025-03-19
Payer: MEDICARE

## 2025-03-19 VITALS
HEIGHT: 62 IN | WEIGHT: 143.4 LBS | SYSTOLIC BLOOD PRESSURE: 122 MMHG | OXYGEN SATURATION: 96 % | DIASTOLIC BLOOD PRESSURE: 80 MMHG | BODY MASS INDEX: 26.39 KG/M2 | HEART RATE: 80 BPM

## 2025-03-19 DIAGNOSIS — I48.0 PAF (PAROXYSMAL ATRIAL FIBRILLATION): ICD-10-CM

## 2025-03-19 DIAGNOSIS — I50.32 CHRONIC HEART FAILURE WITH PRESERVED EJECTION FRACTION (HFPEF): ICD-10-CM

## 2025-03-19 DIAGNOSIS — I34.0 MITRAL VALVE INSUFFICIENCY, UNSPECIFIED ETIOLOGY: Primary | ICD-10-CM

## 2025-03-19 NOTE — PROGRESS NOTES
[facility] Card[facility]  New Patient Consultation       Encounter Date:03/19/2025  Patient ID: Suellen Matamoros is a 84 y.o. female from [place], [place].  Referred by: No ref. provider found     Reason for consultation: Mitral regurgitation for MitraClip    Problem List:  Mitral regurgitation  Echo 11/22/2024: EF 66-70% mild AS, moderate to severe MR, echogenicity consistent possibly with amyloidosis  Chest pain  Stress test 2/20/2025: Normal myocardial perfusion, moderate global hypokinesis, EF 42%  PAF  Newly diagnosed 1/2025, asymptomatic  Hypertension  Type 2 diabetes  Macular degeneration  Surgical history   Tonsillectomy  Oophorectomy  Hernia repair    Subjective:     No Known Allergies      Current Outpatient Medications:     ALPHA LIPOIC ACID PO, Take  by mouth As Needed., Disp: , Rfl:     apixaban (ELIQUIS) 5 MG tablet tablet, Take 1 tablet by mouth 2 (Two) Times a Day., Disp: 60 tablet, Rfl: 2    Ascorbic Acid (VITAMIN C PO), Take 250 mg by mouth Daily., Disp: , Rfl:     Barberry-Oreg Grape-Goldenseal (BERBERINE COMPLEX PO), Take 1 capsule by mouth Daily., Disp: , Rfl:     Calcium Carb-Cholecalciferol (CALCIUM 500+D PO), Take 1 capsule by mouth Daily., Disp: , Rfl:     Cholecalciferol (VITAMIN D-3 PO), Take 400 Units by mouth Daily., Disp: , Rfl:     furosemide (Lasix) 40 MG tablet, Take 1 tablet by mouth Daily., Disp: 30 tablet, Rfl: 1    metFORMIN ER (GLUCOPHAGE-XR) 500 MG 24 hr tablet, Take 1 tablet by mouth Daily With Breakfast., Disp: , Rfl:     metoprolol succinate XL (TOPROL-XL) 25 MG 24 hr tablet, Take 1 tablet by mouth Every 12 (Twelve) Hours. (Patient taking differently: Take 1 tablet by mouth Daily.), Disp: 30 tablet, Rfl: 1    Multiple Vitamins-Minerals (ICAPS AREDS 2 PO), Take 1 capsule by mouth Daily., Disp: , Rfl:     multivitamin (MULTI VITAMIN PO), Take 1 tablet by mouth Daily., Disp: , Rfl:     multivitamin with minerals (VISION FORMULA PO), Take 1 tablet by  mouth Daily., Disp: , Rfl:     Omega-3 Fatty Acids (OMEGA 3 PO), Take 1,000 Units by mouth Daily., Disp: , Rfl:     zinc sulfate (ZINCATE) 220 (50 Zn) MG capsule, Take 1 capsule by mouth Daily., Disp: , Rfl:     HPI:  Suellen Matamoros is a 84 y.o. female referred by Yo Ibanez PA-C from [facility] for  mitral regurgitation for evaluation for possible MitraClip.  Back in November cardiac murmur was auscultated and an echocardiogram was performed showing mild aortic stenosis as well as moderate to severe mitral regurgitation.  Patient was referred here for further evaluation of her mitral valve disease for possible mitral valve repair.  Patient tells me that she was told by her PCP about a year ago that she auscultated a murmur and sent her for cardiac evaluation.  Unfortunately at the time her  was in poor health and she could not make the appointment.  Back in November she went and had an echocardiogram showing significant valvular disease.  She states prior to this winter she had been very active and walking without any significant shortness of breath however she states since December and January states had significant shortness of breath.  It is noted during her echocardiogram she was in normal sinus rhythm but was found to be in atrial fibrillation in January by EKG.  She has been asymptomatic.  She is currently on Eliquis and metoprolol.  She is scheduled for a YARIEL/cardioversion next week.  Patient does tell me that she has never been evaluated for sleep apnea but does snore.  She had a recent admission where she was found to be in A-fib with RVR as well as heart failure.  She was diuresed with Lasix and she states that her fluid buildup has improved since a couple of weeks ago.        Social History     Socioeconomic History    Marital status:    Tobacco Use    Smoking status: Never     Passive exposure: Past    Smokeless tobacco: Never   Vaping Use    Vaping status: Never Used  "  Substance and Sexual Activity    Alcohol use: Never    Drug use: Never    Sexual activity: Defer        Family History   Problem Relation Age of Onset    Pulmonary embolism Mother        The following portions of the patient's history were reviewed and updated as appropriate: allergies, current medications, problem list, surgical history, social history, and family history.    Review of Systems:    A 12-point ROS performed is negative except per listed in HPI.        Objective:   /80 (BP Location: Right arm, Patient Position: Sitting, Cuff Size: Adult)   Pulse 80   Ht 157.5 cm (62\")   Wt 65 kg (143 lb 6.4 oz)   SpO2 96%   BMI 26.23 kg/m²        Physical Exam:  General:Well-developed well-nourished, no acute distress  HEENT: Pupils equal round and reactive to light.  Oropharynx is clear and moist.  CV: Irregularly irregular, 2/6 holosystolic murmur heard best in the apex.  Resp: The lungs are clear bilaterally with no rales or wheezes.  Equal expansion is noted.  GI: The abdomen is soft and nontender with normal bowel sounds throughout.  No hepatosplenomegaly or midline bruits are present.  Musculoskeletal/extremities: No gross joint deformities are noted.  No peripheral edema  Skin: Warm and dry  Neurologic: Nonfocal  Psychiatric: Normal mood and affect      Diagnostic Data:    Lab Results   Component Value Date    WBC 7.17 03/07/2025    HGB 12.0 03/07/2025    HCT 38.7 03/07/2025    MCV 93.5 03/07/2025     03/07/2025     Lab Results   Component Value Date    GLUCOSE 109 (H) 03/17/2025    BUN 21 03/17/2025    CREATININE 0.80 03/17/2025    EGFR 73.2 03/17/2025    BCR 26.3 (H) 03/17/2025    K 4.4 03/17/2025    CO2 27.5 03/17/2025    CALCIUM 9.0 03/17/2025    ALBUMIN 3.6 03/07/2025    AST 36 (H) 03/07/2025    ALT 42 (H) 03/07/2025     Lab Results   Component Value Date    HGBA1C 6.80 (H) 03/07/2025     Lab Results   Component Value Date    TSH 1.230 03/06/2025       Procedures              "   Assessment:     1. Mitral valve insufficiency, unspecified etiology    2. PAF (paroxysmal atrial fibrillation)    3. Chronic heart failure with preserved ejection fraction (HFpEF)       The risks and benefits of the MitraClip versus surgical mitral valve repair were discussed with the patient.          Plan:   Patient will undergo YARIEL/ECV next week in [place].  Discussed with the patient she may feel better after getting back into normal sinus rhythm.  While the patient is here we will schedule her for a transesophageal echocardiogram to evaluate her valve for MitraClip.  Continue Eliquis 5 mg twice daily for stroke prophylaxis.  Continue metoprolol succinate 25 mg daily for rate control and this may be changed by her primary cardiology team given her atrial fibrillation.  Continue Lasix 40 mg daily for now.  She may not need to be on this long-term once she converts into normal sinus rhythm.  Further recommendations as far as MitraClip will be provided after transesophageal echocardiogram.        Advance Care Planning   ACP discussion was declined by the patient. Patient does not have an advance directive, declines further assistance.            Maria Rodriguez PA-C functioning as a scribe for Dr. Janeth Thomas    I Janeth Thomas MD personally performed the services described in this documentation as scribed by the above individual in my presence, and it is both accurate and complete.    Janeth Thomas MD, FACC

## 2025-03-21 NOTE — ADDENDUM NOTE
Encounter addended by: Jsoey Chisholm, PharmD on: 3/21/2025 9:32 AM   Actions taken: Social Care Checklist section edited

## 2025-03-24 ENCOUNTER — ANESTHESIA EVENT (OUTPATIENT)
Dept: CARDIOLOGY | Facility: HOSPITAL | Age: 85
End: 2025-03-24
Payer: MEDICARE

## 2025-03-24 ENCOUNTER — HOSPITAL ENCOUNTER (OUTPATIENT)
Dept: CARDIOLOGY | Facility: HOSPITAL | Age: 85
Discharge: HOME | End: 2025-03-24
Payer: MEDICARE

## 2025-03-24 ENCOUNTER — ANESTHESIA (OUTPATIENT)
Dept: CARDIOLOGY | Facility: HOSPITAL | Age: 85
End: 2025-03-24
Payer: MEDICARE

## 2025-03-24 VITALS
DIASTOLIC BLOOD PRESSURE: 70 MMHG | OXYGEN SATURATION: 96 % | SYSTOLIC BLOOD PRESSURE: 102 MMHG | TEMPERATURE: 97.9 F | HEART RATE: 69 BPM | HEIGHT: 62 IN | BODY MASS INDEX: 26.13 KG/M2 | WEIGHT: 142 LBS | RESPIRATION RATE: 17 BRPM

## 2025-03-24 DIAGNOSIS — I48.19 PERSISTENT ATRIAL FIBRILLATION: ICD-10-CM

## 2025-03-24 PROCEDURE — 93005 ELECTROCARDIOGRAM TRACING: CPT | Performed by: INTERNAL MEDICINE

## 2025-03-24 PROCEDURE — 93325 DOPPLER ECHO COLOR FLOW MAPG: CPT

## 2025-03-24 PROCEDURE — 25010000002 PROPOFOL 200 MG/20ML EMULSION: Performed by: NURSE ANESTHETIST, CERTIFIED REGISTERED

## 2025-03-24 PROCEDURE — 92960 CARDIOVERSION ELECTRIC EXT: CPT

## 2025-03-24 PROCEDURE — 93321 DOPPLER ECHO F-UP/LMTD STD: CPT

## 2025-03-24 PROCEDURE — 93312 ECHO TRANSESOPHAGEAL: CPT

## 2025-03-24 RX ORDER — PROPOFOL 10 MG/ML
INJECTION, EMULSION INTRAVENOUS AS NEEDED
Status: DISCONTINUED | OUTPATIENT
Start: 2025-03-24 | End: 2025-03-24 | Stop reason: SURG

## 2025-03-24 RX ADMIN — PROPOFOL 30 MG: 10 INJECTION, EMULSION INTRAVENOUS at 09:58

## 2025-03-24 RX ADMIN — PROPOFOL 30 MG: 10 INJECTION, EMULSION INTRAVENOUS at 09:55

## 2025-03-24 RX ADMIN — PROPOFOL 30 MG: 10 INJECTION, EMULSION INTRAVENOUS at 09:52

## 2025-03-24 RX ADMIN — PROPOFOL 60 MG: 10 INJECTION, EMULSION INTRAVENOUS at 09:51

## 2025-03-24 NOTE — ANESTHESIA POSTPROCEDURE EVALUATION
Patient: Suellen Matamoros    Procedure Summary       Date: 03/24/25 Room / Location: [facility]    Anesthesia Start: 0949 Anesthesia Stop: 1009    Procedure: CARDIOVERSION EXTERNAL IN CARDIOLOGY DEPARTMENT Diagnosis:       Persistent atrial fibrillation      (atrial fibrillation)    Scheduled Providers: Sania Dobbs MD Provider: Ronald Jimenez CRNA    Anesthesia Type: general ASA Status: 3            Anesthesia Type: general    Vitals  Vitals Value Taken Time   BP 96/84 03/24/25 10:11   Temp     Pulse 69 03/24/25 10:11   Resp 17 03/24/25 10:11   SpO2 95 % 03/24/25 10:11           Post Anesthesia Care and Evaluation    Patient location during evaluation: bedside  Patient participation: complete - patient participated  Level of consciousness: awake and alert  Pain score: 0  Pain management: adequate    Airway patency: patent  Anesthetic complications: No anesthetic complications  PONV Status: controlled  Cardiovascular status: acceptable  Respiratory status: acceptable  Hydration status: acceptable

## 2025-03-26 LAB
BH CV ECHO MEAS - MR MAX PG: 65.9 MMHG
BH CV ECHO MEAS - MR MAX VEL: 406 CM/SEC
BH CV ECHO MEAS - MR MEAN PG: 39 MMHG
BH CV ECHO MEAS - MR MEAN VEL: 284 CM/SEC
BH CV ECHO MEAS - MR VTI: 101 CM
LV EF 2D ECHO EST: 55 %
QT INTERVAL: 386 MS
QTC INTERVAL: 416 MS

## 2025-03-26 NOTE — INTERVAL H&P NOTE
H&P reviewed. The patient was examined and there are no changes to the H&P.        
03-Oct-2023 01:11:44

## 2025-03-31 ENCOUNTER — HOSPITAL ENCOUNTER (OUTPATIENT)
Dept: ULTRASOUND IMAGING | Facility: HOSPITAL | Age: 85
Discharge: HOME | End: 2025-03-31
Admitting: PHYSICIAN ASSISTANT
Payer: MEDICARE

## 2025-03-31 ENCOUNTER — OFFICE VISIT (OUTPATIENT)
Dept: CARDIOLOGY | Facility: CLINIC | Age: 85
End: 2025-03-31
Payer: MEDICARE

## 2025-03-31 VITALS
HEART RATE: 53 BPM | DIASTOLIC BLOOD PRESSURE: 72 MMHG | OXYGEN SATURATION: 95 % | WEIGHT: 138 LBS | SYSTOLIC BLOOD PRESSURE: 101 MMHG | HEIGHT: 62 IN | BODY MASS INDEX: 25.4 KG/M2

## 2025-03-31 DIAGNOSIS — E04.1 NONTOXIC SINGLE THYROID NODULE: ICD-10-CM

## 2025-03-31 DIAGNOSIS — I48.19 PERSISTENT ATRIAL FIBRILLATION: Primary | ICD-10-CM

## 2025-03-31 PROCEDURE — 76536 US EXAM OF HEAD AND NECK: CPT | Performed by: RADIOLOGY

## 2025-03-31 PROCEDURE — 93000 ELECTROCARDIOGRAM COMPLETE: CPT | Performed by: PHYSICIAN ASSISTANT

## 2025-03-31 PROCEDURE — 99214 OFFICE O/P EST MOD 30 MIN: CPT | Performed by: PHYSICIAN ASSISTANT

## 2025-03-31 PROCEDURE — 76536 US EXAM OF HEAD AND NECK: CPT

## 2025-03-31 RX ORDER — METOPROLOL SUCCINATE 25 MG/1
25 TABLET, EXTENDED RELEASE ORAL EVERY 12 HOURS SCHEDULED
Qty: 90 TABLET | Refills: 2 | Status: SHIPPED | OUTPATIENT
Start: 2025-03-31

## 2025-03-31 NOTE — PROGRESS NOTES
Janie Reid PA  Suellen Matamoros  1940 03/31/2025    Patient Active Problem List   Diagnosis    Borderline hypertension    Premature atrial beats    Abnormal EKG    Persistent atrial fibrillation with a [id number] score of 5       Dear Janie Reid PA:    Subjective     History of Present Illness:    Chief Complaint   Patient presents with    Med Management     Verbal.     Results     Cardioversion.        Suellen Matamoros is a pleasant 84 y.o. female with a past medical history significant for persistent atrial fibrillation with [id number] score of 5, moderate to severe mitral valve regurgitation, diabetes mellitus, essential hypertension.  She comes in today for cardiology follow-up.    Since Suellen was last seen she was hospitalized for acute on chronic HFpEF where she was diuresed and subsequently respiratory status improved.  Since then she has followed up with both the heart failure clinic and Dr. Thomas for evaluation for possible MitraClip.  Clinically Suellen reports she is doing significantly better and is able to walk and perform her ADLs without a significant dyspnea.  She also denies significant orthopnea, PND, or pedal edema.  She is scheduled to have YARIEL by Dr. Thomas for further evaluation for MitraClip.  Unfortunately she believes she had converted back to atrial fibrillation within 24 hours of her cardioversion.      No Known Allergies:      Current Outpatient Medications:     ALPHA LIPOIC ACID PO, Take  by mouth As Needed., Disp: , Rfl:     apixaban (ELIQUIS) 5 MG tablet tablet, Take 1 tablet by mouth 2 (Two) Times a Day., Disp: 60 tablet, Rfl: 2    Ascorbic Acid (VITAMIN C PO), Take 250 mg by mouth Daily., Disp: , Rfl:     Barberry-Oreg Grape-Goldenseal (BERBERINE COMPLEX PO), Take 1 capsule by mouth Daily., Disp: , Rfl:     Calcium Carb-Cholecalciferol (CALCIUM 500+D PO), Take 1 capsule by mouth Daily., Disp: , Rfl:     Cholecalciferol (VITAMIN D-3 PO),  "Take 400 Units by mouth Daily., Disp: , Rfl:     furosemide (Lasix) 40 MG tablet, Take 1 tablet by mouth Daily., Disp: 30 tablet, Rfl: 1    metFORMIN ER (GLUCOPHAGE-XR) 500 MG 24 hr tablet, Take 1 tablet by mouth Daily With Breakfast., Disp: , Rfl:     metoprolol succinate XL (TOPROL-XL) 25 MG 24 hr tablet, Take 1 tablet by mouth Every 12 (Twelve) Hours., Disp: 90 tablet, Rfl: 2    Multiple Vitamins-Minerals (ICAPS AREDS 2 PO), Take 1 capsule by mouth Daily., Disp: , Rfl:     multivitamin (MULTI VITAMIN PO), Take 1 tablet by mouth Daily., Disp: , Rfl:     multivitamin with minerals (VISION FORMULA PO), Take 1 tablet by mouth Daily., Disp: , Rfl:     Omega-3 Fatty Acids (OMEGA 3 PO), Take 1,000 Units by mouth Daily., Disp: , Rfl:     zinc sulfate (ZINCATE) 220 (50 Zn) MG capsule, Take 1 capsule by mouth Daily., Disp: , Rfl:     The following portions of the patient's history were reviewed and updated as appropriate: allergies, current medications, past family history, past medical history, past social history, past surgical history and problem list.    Social History     Tobacco Use    Smoking status: Never     Passive exposure: Past    Smokeless tobacco: Never   Vaping Use    Vaping status: Never Used   Substance Use Topics    Alcohol use: Never    Drug use: Never         Objective   Vitals:    03/31/25 1345   BP: 101/72   BP Location: Left arm   Patient Position: Sitting   Cuff Size: Adult   Pulse: 53   SpO2: 95%   Weight: 62.6 kg (138 lb)   Height: 157.5 cm (62\")     Body mass index is 25.24 kg/m².    ROS    Constitutional:       General: Not in acute distress.     Appearance: Healthy appearance. Well-developed and not in distress. Not diaphoretic.   Eyes:      Conjunctiva/sclera: Conjunctivae normal.      Pupils: Pupils are equal, round, and reactive to light.   HENT:      Head: Normocephalic and atraumatic.   Neck:      Vascular: No carotid bruit or JVD.   Pulmonary:      Effort: Pulmonary effort is normal. No " "respiratory distress.      Breath sounds: Normal breath sounds.   Cardiovascular:      Normal rate. Irregularly irregular rhythm.   Edema:     Peripheral edema absent.   Skin:     General: Skin is cool.   Neurological:      Mental Status: Alert, oriented to person, place, and time and oriented to person, place and time.         Lab Results   Component Value Date     03/17/2025    K 4.4 03/17/2025     03/17/2025    CO2 27.5 03/17/2025    BUN 21 03/17/2025    CREATININE 0.80 03/17/2025    GLUCOSE 109 (H) 03/17/2025    CALCIUM 9.0 03/17/2025    AST 36 (H) 03/07/2025    ALT 42 (H) 03/07/2025    ALKPHOS 76 03/07/2025     No results found for: \"CKTOTAL\"  Lab Results   Component Value Date    WBC 7.17 03/07/2025    HGB 12.0 03/07/2025    HCT 38.7 03/07/2025     03/07/2025     Lab Results   Component Value Date    INR 1.25 (H) 03/06/2025     Lab Results   Component Value Date    MG 2.1 03/17/2025     Lab Results   Component Value Date    TSH 1.230 03/06/2025      No results found for: \"BNP\"    During this visit the following were done:  Labs Reviewed []    Labs Ordered []    Radiology Reports Reviewed []    Radiology Ordered []    PCP Records Reviewed []    Referring Provider Records Reviewed []    ER Records Reviewed []    Hospital Records Reviewed []    History Obtained From Family []    Radiology Images Reviewed []    Other Reviewed []    Records Requested []         ECG 12 Lead    Date/Time: 3/31/2025 3:06 PM  Performed by: Yo Ibanez PA-C    Authorized by: Yo Ibanez PA-C  Comparison: compared with previous ECG   Similar to previous ECG  Rhythm: atrial fibrillation    Clinical impression: abnormal EKG          Assessment & Plan    Diagnosis Plan   1. Persistent atrial fibrillation with a [id number] score of 5                 Recommendations:  Chronic HFpEF  Doing significantly better from clinical standpoint from her last visit after being diuresed.  She is able to form more for ADLs " now and is walking regularly.  Will continue to pursue GDMT but limited given her hypotension so we will continue with metoprolol and Lasix for now  Moderate to severe MR  Scheduled for YARIEL by Dr. Thomas for evaluation of MitraClip  Persistent atrial fibrillation  She is back in atrial fibrillation today but is rate controlled and clinically doing significantly better.  With her structural heart disease would be limited with antiarrhythmics with amiodarone probably being her best option and after discussing potential side effects she is reluctant to start this.  And given the improvement in her clinical status I think we can consider rate control at this point.  Continue Eliquis for anticoagulation    No follow-ups on file.    As always, I appreciate very much the opportunity to participate in the cardiovascular care of your patients.      With Best Regards,    Yo Ibanez PA-C

## 2025-04-14 ENCOUNTER — HOSPITAL ENCOUNTER (OUTPATIENT)
Dept: CARDIOLOGY | Facility: HOSPITAL | Age: 85
Discharge: HOME | End: 2025-04-14
Admitting: PHYSICIAN ASSISTANT
Payer: MEDICARE

## 2025-04-14 VITALS
BODY MASS INDEX: 25.47 KG/M2 | HEIGHT: 62 IN | SYSTOLIC BLOOD PRESSURE: 105 MMHG | WEIGHT: 138.4 LBS | DIASTOLIC BLOOD PRESSURE: 61 MMHG | HEART RATE: 85 BPM | OXYGEN SATURATION: 95 %

## 2025-04-14 DIAGNOSIS — I50.32 CHRONIC HEART FAILURE WITH PRESERVED EJECTION FRACTION (HFPEF): Primary | ICD-10-CM

## 2025-04-14 DIAGNOSIS — I48.19 PERSISTENT ATRIAL FIBRILLATION: ICD-10-CM

## 2025-04-14 DIAGNOSIS — I34.0 MODERATE TO SEVERE MITRAL REGURGITATION: ICD-10-CM

## 2025-04-14 DIAGNOSIS — E11.65 TYPE 2 DIABETES MELLITUS WITH HYPERGLYCEMIA, WITHOUT LONG-TERM CURRENT USE OF INSULIN: ICD-10-CM

## 2025-04-14 LAB
ABSOLUTE LUNG FLUID CONTENT: 30 % (ref 20–35)
ANION GAP SERPL CALCULATED.3IONS-SCNC: 10.2 MMOL/L (ref 5–15)
BUN SERPL-MCNC: 23 MG/DL (ref 8–23)
BUN/CREAT SERPL: 31.1 (ref 7–25)
CALCIUM SPEC-SCNC: 9.4 MG/DL (ref 8.6–10.5)
CHLORIDE SERPL-SCNC: 105 MMOL/L (ref 98–107)
CO2 SERPL-SCNC: 24.8 MMOL/L (ref 22–29)
CREAT SERPL-MCNC: 0.74 MG/DL (ref 0.57–1)
EGFRCR SERPLBLD CKD-EPI 2021: 80.4 ML/MIN/1.73
GLUCOSE SERPL-MCNC: 125 MG/DL (ref 65–99)
MAGNESIUM SERPL-MCNC: 2.1 MG/DL (ref 1.6–2.4)
NT-PROBNP SERPL-MCNC: 1085 PG/ML (ref 0–1800)
POTASSIUM SERPL-SCNC: 4.3 MMOL/L (ref 3.5–5.2)
SODIUM SERPL-SCNC: 140 MMOL/L (ref 136–145)

## 2025-04-14 PROCEDURE — 94726 PLETHYSMOGRAPHY LUNG VOLUMES: CPT | Performed by: PHYSICIAN ASSISTANT

## 2025-04-14 PROCEDURE — 83735 ASSAY OF MAGNESIUM: CPT | Performed by: PHYSICIAN ASSISTANT

## 2025-04-14 PROCEDURE — G2211 COMPLEX E/M VISIT ADD ON: HCPCS | Performed by: PHYSICIAN ASSISTANT

## 2025-04-14 PROCEDURE — 83880 ASSAY OF NATRIURETIC PEPTIDE: CPT | Performed by: PHYSICIAN ASSISTANT

## 2025-04-14 PROCEDURE — 36415 COLL VENOUS BLD VENIPUNCTURE: CPT | Performed by: PHYSICIAN ASSISTANT

## 2025-04-14 PROCEDURE — 80048 BASIC METABOLIC PNL TOTAL CA: CPT | Performed by: PHYSICIAN ASSISTANT

## 2025-04-14 PROCEDURE — 99214 OFFICE O/P EST MOD 30 MIN: CPT | Performed by: PHYSICIAN ASSISTANT

## 2025-04-14 NOTE — PROGRESS NOTES
Heart Failure Clinic  Pharmacist Note     Suellen Matamoros is a 84 y.o. female seen in the Heart Failure Clinic for HFpEF. The last ejection fraction was EF 66-70% from 11/22/24. She was recently admitted from 3/6/25-3/10/25 for SOB and A Fib. Upon discharge, Lasix 40 mg daily and Toprol 25 mg were started.     Suellen Matamoros reports a good understanding of medications. Her son is with her in clinic today and he helps her with her medications. Patient reports stopping losartan and reports that she is not actively eating salt like before. She denies any SOB and does not notice any swelling. She continues to take Lasix 40mg daily. Son had her BP log and BP has resolved with stopping losartan and was in the 100-110's/60-80's and patient denied any dizziness, except for when she stands up from a lying position. She reports that she is taking her Toprol, as a 1/2 tablet bid.       Medication Use:   Hx of med intolerances: None related to HF  Retail Rx Management: [facility] in [place]    Past Medical History:   Diagnosis Date    Macular degeneration      ALLERGIES: Patient has no known allergies.  Current Outpatient Medications   Medication Sig Dispense Refill    ALPHA LIPOIC ACID PO Take  by mouth As Needed.      apixaban (ELIQUIS) 5 MG tablet tablet Take 1 tablet by mouth 2 (Two) Times a Day. 60 tablet 2    Ascorbic Acid (VITAMIN C PO) Take 250 mg by mouth Daily.      Barberry-Oreg Grape-Goldenseal (BERBERINE COMPLEX PO) Take 1 capsule by mouth Daily.      Calcium Carb-Cholecalciferol (CALCIUM 500+D PO) Take 1 capsule by mouth Daily.      Cholecalciferol (VITAMIN D-3 PO) Take 400 Units by mouth Daily.      furosemide (Lasix) 40 MG tablet Take 1 tablet by mouth Daily. 30 tablet 1    metFORMIN ER (GLUCOPHAGE-XR) 500 MG 24 hr tablet Take 1 tablet by mouth Daily With Breakfast.      metoprolol succinate XL (TOPROL-XL) 25 MG 24 hr tablet Take 1 tablet by mouth Every 12 (Twelve) Hours. (Patient taking differently: Take 1  "tablet by mouth Every 12 (Twelve) Hours. 1/2 tablet bid) 90 tablet 2    Multiple Vitamins-Minerals (ICAPS AREDS 2 PO) Take 1 capsule by mouth Daily.      multivitamin (MULTI VITAMIN PO) Take 1 tablet by mouth Daily.      multivitamin with minerals (VISION FORMULA PO) Take 1 tablet by mouth Daily.      Omega-3 Fatty Acids (OMEGA 3 PO) Take 1,000 Units by mouth Daily.      zinc sulfate (ZINCATE) 220 (50 Zn) MG capsule Take 1 capsule by mouth Daily.       No current facility-administered medications for this encounter.       Vaccination History:   Pneumonia: not interested   Annual Influenza: not interested   Shingles: not interested     Objective  Vitals:    04/14/25 1511   BP: 105/61   BP Location: Left arm   Patient Position: Sitting   Cuff Size: Adult   Pulse: 85   SpO2: 95%   Weight: 62.8 kg (138 lb 6.4 oz)   Height: 157.5 cm (62\")       Wt Readings from Last 3 Encounters:   04/14/25 62.8 kg (138 lb 6.4 oz)   03/31/25 62.6 kg (138 lb)   03/24/25 64.4 kg (142 lb)         04/14/25  1511   Weight: 62.8 kg (138 lb 6.4 oz)       Lab Results   Component Value Date    GLUCOSE 125 (H) 04/14/2025    BUN 23 04/14/2025    CREATININE 0.74 04/14/2025    BCR 31.1 (H) 04/14/2025    K 4.3 04/14/2025    CO2 24.8 04/14/2025    CALCIUM 9.4 04/14/2025    ALBUMIN 3.6 03/07/2025    AST 36 (H) 03/07/2025    ALT 42 (H) 03/07/2025     Lab Results   Component Value Date    WBC 7.17 03/07/2025    HGB 12.0 03/07/2025    HCT 38.7 03/07/2025    MCV 93.5 03/07/2025     03/07/2025     Lab Results   Component Value Date    TROPONINT 16 (H) 03/06/2025     Lab Results   Component Value Date    PROBNP 1,085.0 04/14/2025     Results for orders placed during the hospital encounter of 03/24/25    Adult Transesophageal Echo (YARIEL) W/ Cont if Necessary Per Protocol    Interpretation Summary    Left ventricular systolic function is normal. Estimated left ventricular EF = 55%    The left ventricular cavity is dilated.    The left atrial cavity is " dilated.    No evidence of a left atrial appendage thrombus was present.    The right atrial cavity is dilated.    Moderate mitral valve regurgitation is present. VCW is 0.4 cm, EROA 0.22 cm2, RVol 22 ml.    Moderate tricuspid valve regurgitation is present.    Mild aortic valve regurgitation is present    Will proceed with the cardioversion         GDMT     Drug Class   Drug   Dose Last Dose Adjustment Additional Titration   Notes   ACEi/ARB/ARNI      Beta Blocker Toprol 25mg      MRA        SGLT2i    Older age    Lasix 40mg daily      No recommendations at this time      Patient was educated on heart failure medications and the importance of medication adherence. All questions were addressed and patient expressed good understanding.   Thank you for allowing me to participate in the care of your patient,    Josey Chisholm, PharmD  04/14/25  16:57 EDT

## 2025-04-14 NOTE — PROGRESS NOTES
Heart Failure Clinic    Date: 04/14/25     Vitals:    04/14/25 1511   BP: 105/61   Pulse: 85   SpO2: 95%      Weight 138.4  Method of arrival: Ambulatory    Weighing self daily: Most days    Monitoring Heart Failure Zones: Most days    Today's HF Zone: Green    Taking medications as prescribed: Yes    Edema No    Shortness of Air: Yes with activity    Number of pillows used at night:<2    Educational Materials given:  avs                                                                         ReDS Value: 30  25-35 Optimal Value Status      Jasmin Vega RN 04/14/25 15:13 EDT

## 2025-04-14 NOTE — PROGRESS NOTES
[facility]  Toyin Nichole PA-C       Referring, [place] [address]    Thank you for asking me to see Suellen Matamoros for congestive heart failure.    HPI:     This is a 84 y.o. female with known past medical history of:    Chronic HFpEF  TTE from 11/22/24 with EF 66-70%, grade 1a diastolic dysfunction.    Atrial fibrillation  Moderate to severe mitral valve regurgitation  Essential hypertension  Type 2 DM     Suellen Matamoros presents for today for Heart Failure evaluation.  The patient is typically seen by Janie Reid PA.  Patient's primary cardiologist is Dr. Asif.     Last known EF 66-70%.   Last known hospitalization and/or ED visit: Hospitalized from 03/06/25 through 03/07/25 with Afib with RVR and acute hypoxemic respiratory failure.   Accompanied by: Son on 1st visit.           04/14/2025 visit data/details regarding:   Dyspnea: Dyspnea with prolonged exertion  Lower extremity swelling: Improving  Abdominal swelling: Denies  Home weight: Weight monitoring booklet provided during initial visit; Has BP cuff.   Home BP: BP monitoring booklet provided during initial visit; Has scale.   Home heart rate: HR monitoring booklet provided during initial visit  Daily activities of living: Performing on her own  Pillows/lying flat: 2 pillows in bed   HF zone: Green  Mrs. Matamoros reports she is doing well. She is chest pain free.  She is without significant dyspnea on exertion.  She reports she is also not having much swelling.  She is performing her daily activities of living independently.            Review of Systems - Review of Systems   Constitutional: Negative for decreased appetite and diaphoresis.   HENT:  Negative for congestion and ear pain.    Eyes:  Negative for blurred vision and double vision.   Cardiovascular:  Negative for dyspnea on exertion and leg swelling.   Respiratory:  Negative for shortness of breath.    Gastrointestinal:   Negative for bloating and anorexia.   Genitourinary:  Negative for bladder incontinence and dysuria.         All other systems were reviewed and were negative.    Patient Active Problem List   Diagnosis    Borderline hypertension    Premature atrial beats    Abnormal EKG    Persistent atrial fibrillation with a [id number]-[id number] score of 5    Moderate to severe mitral regurgitation    Chronic heart failure with preserved ejection fraction (HFpEF)    Type 2 diabetes mellitus with hyperglycemia, without long-term current use of insulin       family history includes Pulmonary embolism in her mother.     reports that she has never smoked. She has been exposed to tobacco smoke. She has never used smokeless tobacco. She reports that she does not drink alcohol and does not use drugs.    No Known Allergies      Current Outpatient Medications:     ALPHA LIPOIC ACID PO, Take  by mouth As Needed., Disp: , Rfl:     apixaban (ELIQUIS) 5 MG tablet tablet, Take 1 tablet by mouth 2 (Two) Times a Day., Disp: 60 tablet, Rfl: 2    Ascorbic Acid (VITAMIN C PO), Take 250 mg by mouth Daily., Disp: , Rfl:     Barberry-Oreg Grape-Goldenseal (BERBERINE COMPLEX PO), Take 1 capsule by mouth Daily., Disp: , Rfl:     Calcium Carb-Cholecalciferol (CALCIUM 500+D PO), Take 1 capsule by mouth Daily., Disp: , Rfl:     Cholecalciferol (VITAMIN D-3 PO), Take 400 Units by mouth Daily., Disp: , Rfl:     furosemide (Lasix) 40 MG tablet, Take 1 tablet by mouth Daily., Disp: 30 tablet, Rfl: 1    metFORMIN ER (GLUCOPHAGE-XR) 500 MG 24 hr tablet, Take 1 tablet by mouth Daily With Breakfast., Disp: , Rfl:     metoprolol succinate XL (TOPROL-XL) 25 MG 24 hr tablet, Take 1 tablet by mouth Every 12 (Twelve) Hours. (Patient taking differently: Take 1 tablet by mouth Every 12 (Twelve) Hours. 1/2 tablet bid), Disp: 90 tablet, Rfl: 2    Multiple Vitamins-Minerals (ICAPS AREDS 2 PO), Take 1 capsule by mouth Daily., Disp: , Rfl:     multivitamin (MULTI VITAMIN PO), Take 1  tablet by mouth Daily., Disp: , Rfl:     multivitamin with minerals (VISION FORMULA PO), Take 1 tablet by mouth Daily., Disp: , Rfl:     Omega-3 Fatty Acids (OMEGA 3 PO), Take 1,000 Units by mouth Daily., Disp: , Rfl:     zinc sulfate (ZINCATE) 220 (50 Zn) MG capsule, Take 1 capsule by mouth Daily., Disp: , Rfl:       Physical Exam:  I have reviewed the patient's current vital signs as documented in the patient's EMR.   Vitals:    04/14/25 1511   BP: 105/61   Pulse: 85   SpO2: 95%     Body mass index is 25.31 kg/m².       04/14/25  1511   Weight: 62.8 kg (138 lb 6.4 oz)      Physical Exam  Vitals and nursing note reviewed.   Constitutional:       General: She is awake.      Appearance: Normal appearance. She is well-developed and well-groomed.   HENT:      Head: Normocephalic and atraumatic.   Eyes:      General: Lids are normal.      Comments: Reports ongoing central vision issues with known macular degeneration.    Cardiovascular:      Rate and Rhythm: Normal rate. Rhythm irregularly irregular.      Heart sounds: Murmur heard.   Abdominal:      General: Bowel sounds are normal.      Palpations: Abdomen is soft.      Tenderness: There is no abdominal tenderness.   Neurological:      Mental Status: She is alert and oriented to person, place, and time.   Psychiatric:         Attention and Perception: Attention normal.         Mood and Affect: Mood normal.         Behavior: Behavior is cooperative.          JVP: Volume/Pulsation: Normal.        DATA REVIEWED:       ---------------------------------------------------  TTE/YARIEL:  Results for orders placed during the hospital encounter of 03/24/25    Adult Transesophageal Echo (YARIEL) W/ Cont if Necessary Per Protocol    Interpretation Summary    Left ventricular systolic function is normal. Estimated left ventricular EF = 55%    The left ventricular cavity is dilated.    The left atrial cavity is dilated.    No evidence of a left atrial appendage thrombus was present.     "The right atrial cavity is dilated.    Moderate mitral valve regurgitation is present. VCW is 0.4 cm, EROA 0.22 cm2, RVol 22 ml.    Moderate tricuspid valve regurgitation is present.    Mild aortic valve regurgitation is present    Will proceed with the cardioversion        LAST HEART CATH RESULT/IF AVAILABLE:     No results found for this or any previous visit.      -----------------------------------------------------  CXR/Imaging:   Imaging Results (Most Recent)       None            I personally reviewed and interpreted the CXR.      -----------------------------------------------------  CT:   US Thyroid  Result Date: 3/31/2025    Largest nodule in the left lobe of the thyroid isoechoic with central cystic component, measuring 1 x 0.9 cm.  TI-RADS points: 2.  TI-RADS category: TR2.  This nodule is not suspicious and no FNA or follow-up is necessary.  This report was finalized on 3/31/2025 1:16 PM by Dr. Esvin Quan MD.      I personally reviewed the images of the CT scan.  My personal interpretation is below.      ----------------------------------------------------      --------------------------------------------------------------------------------------------------    Laboratory evaluations:    Lab Results   Component Value Date    GLUCOSE 125 (H) 04/14/2025    BUN 23 04/14/2025    CREATININE 0.74 04/14/2025    BCR 31.1 (H) 04/14/2025    K 4.3 04/14/2025    CO2 24.8 04/14/2025    CALCIUM 9.4 04/14/2025    ALBUMIN 3.6 03/07/2025    AST 36 (H) 03/07/2025    ALT 42 (H) 03/07/2025     Lab Results   Component Value Date    WBC 7.17 03/07/2025    HGB 12.0 03/07/2025    HCT 38.7 03/07/2025    MCV 93.5 03/07/2025     03/07/2025     No results found for: \"CHOL\", \"CHLPL\", \"TRIG\", \"HDL\", \"LDL\", \"LDLDIRECT\"  Lab Results   Component Value Date    TSH 1.230 03/06/2025     Lab Results   Component Value Date    HGBA1C 6.80 (H) 03/07/2025     Lab Results   Component Value Date    ALT 42 (H) 03/07/2025     Lab Results " "  Component Value Date    HGBA1C 6.80 (H) 03/07/2025     Lab Results   Component Value Date    CREATININE 0.74 04/14/2025     No results found for: \"IRON\", \"TIBC\", \"FERRITIN\"  Lab Results   Component Value Date    INR 1.25 (H) 03/06/2025    PROTIME 15.9 (H) 03/06/2025        Lab Results   Component Value Date    ABSOLUTELUNG 30 04/14/2025    ABSOLUTELUNG 37 (A) 03/17/2025    ABSOLUTELUNG 38 (A) 03/07/2025           1. Chronic heart failure with preserved ejection fraction (HFpEF)    2. Persistent atrial fibrillation with a [id number]-MEYw score of 5    3. Moderate to severe mitral regurgitation    4. Type 2 diabetes mellitus with hyperglycemia, without long-term current use of insulin          ORDERS PLACED TODAY:  Orders Placed This Encounter   Procedures    ReDs Vest    Basic Metabolic Panel    Magnesium    proBNP    Basic Metabolic Panel    Magnesium    proBNP        Diagnoses and all orders for this visit:    1. Chronic heart failure with preserved ejection fraction (HFpEF) (Primary)  -     Basic Metabolic Panel; Future  -     Magnesium; Future  -     proBNP; Future  -     Basic Metabolic Panel; Standing  -     Basic Metabolic Panel  -     Magnesium; Standing  -     Magnesium  -     proBNP; Standing  -     proBNP  -     ReDs Vest    2. Persistent atrial fibrillation with a [id number]-RSVr score of 5    3. Moderate to severe mitral regurgitation    4. Type 2 diabetes mellitus with hyperglycemia, without long-term current use of insulin             MEDS ORDERED TODAY:    No orders of the defined types were placed in this encounter.       ---------------------------------------------------------------------------------------------------------------------------          ASSESSMENT/PLAN:      Diagnosis Plan   1. Chronic heart failure with preserved ejection fraction (HFpEF)  Basic Metabolic Panel    Magnesium    proBNP    Basic Metabolic Panel    Basic Metabolic Panel    Magnesium    Magnesium    proBNP    proBNP    ReDs " Vest      2. Persistent atrial fibrillation with a [id number]-QNRb score of 5        3. Moderate to severe mitral regurgitation        4. Type 2 diabetes mellitus with hyperglycemia, without long-term current use of insulin            not acutely decompensated chronic diastolic heart failure. CHF.     CHF GOAL DIRECTED MEDICAL THERAPY FOR PATIENT ADDRESSED/ADJUSTED:     GDMT: HFpEF    Drug Class   Drug   Dose Last Dose Adjustment Notes   ACEi/ARB/ARNI Stopped on 03/17/25 visit due to hypotension    Beta Blocker Toprol XL  25mg      MRA       SGLT2i    N/A   Secondaries if applicable:          -CHF Specific BB:    Metoprolol Succinate  at dose of 25mg qd.   We discussed processes/benefits of HF clinic including nursing, pharmacist, and provider evaluation during each visit with ability for in office ReDS vest, labs, and ability to provide IV diuresis in the clinic with close outpatient monitoring.  Additionally, patient was educated about the availability of delivery of medications to patient's clinic room prior to leaving the building which assists with medication compliance and insures medications are in hands when changes are made (if patient opts for apothecary usage) with thorough guidance regarding changes and medication schedule provided.          -ACE/ARB/ARNi:   Stop Losartan 2/2 lower blood pressures at home.     -SGLT2i:   Patient wishes to hold on medication additions at present.     -Diuretic regimen:   ReDS Vest reading for. 04/15/25 is 30; ReDs Vest reading reviewed with patient.    Continue Lasix 40mg qd.    BMP, Mag, & ProBNP obtained and reviewed with patient.  ProBNP is WNL. Creatinine is stable.      -Fluid restriction/Sodium restriction:   Requested 2000 ml restriction  Patient has been asked to weigh daily and was provided with a printed diuretic strategy.  1,500 mg Na restriction was discussed.        -Acute and/or Chronic underlying conditions other than HF addressed during visit:   Moderate to  Severe Mitral regurgitation:   Patient has appointment for YARIEL on 04/15 to further assess mitral valve regurgitation.   Continue f/u with Dr. Janeth Thomas.      Essential HTN:   BP well controlled without Losartan ( 105/61 today).      Paroxysmal atrial fibrillation:   Continue Toprol XL 25mg qd.   Continue Eliquis 5mg BID.   Continue f/u with [facility].      Type 2 DM with Hyperglycemia:   Continue f/u with PCP.      Identifiable barriers to Heart Failure Self-care:   Medical Barriers:  Vision difficulties with macular degeneration  Social Barriers:  No immediate known social barriers.     ---------------------------------------------------------              This document has been electronically signed by Toyin Nichole PA-C  April 15, 2025 08:31 EDT      Dictated Utilizing Dragon Dictation: Part of this note may be an electronic transcription/translation of spoken language to printed text using the Dragon Dictation System.    Follow-up appointment and medication changes provided in hand delivered After Visit Summary as well as reviewed in the room.

## 2025-04-14 NOTE — PATIENT INSTRUCTIONS
Heart Failure Action Plan  A heart failure action plan helps you know what to do when you have symptoms of heart failure. Your action plan is a color-coded plan that lists the symptoms to watch for and indicates what actions to take.  If you have symptoms in the green zone, you're doing well.  If you have symptoms in the yellow zone, you're having problems.  If you have symptoms in the red zone, you need medical care right away.  Follow the plan that was created by you and your health care provider. Review your plan each time you visit your provider.  Green zone  These signs mean you're doing well and can continue what you're doing:  You don't have new or worsening shortness of breath.  You have very little swelling or no new swelling.  Your weight is stable (no gain or loss).  You have a normal activity level.  You don't have chest pain or any other new symptoms.  Yellow zone  These signs and symptoms mean your condition may be getting worse and you should make some changes:  You have trouble breathing when you're active.  You have swelling in your feet or legs or have discomfort in your belly.  You gain 2-3 lb (0.9-1.4 kg) in 24 hours, or 5 lb (2.3 kg) in a week. This amount may be more or less depending on your condition.  You get tired easily.  You have trouble sleeping.  You have a dry cough.  If you have any of these symptoms:  Contact your provider within the next day.  Your provider may adjust your medicines.  Red zone  These signs and symptoms mean you should get medical help right away:  You have trouble breathing when resting or cannot lie flat and you need to raise your head to help you breathe.  You have a dry cough that's getting worse.  You have swelling or pain in your feet or legs or discomfort in your belly that's getting worse.  You suddenly gain more than 2-3 lb (0.9-1.4 kg) in 24 hours, or more than 5 lb (2.3 kg) in a week. This amount may be more or less depending on your condition.  You have  trouble staying awake or you feel confused.  You don't have an appetite.  You have worsening sadness or depression.  These symptoms may be an emergency. Call 911 right away.  Do not wait to see if the symptoms will go away.  Do not drive yourself to the hospital.  Follow these instructions at home:  Take medicines only as told.  Eat a heart-healthy diet. Work with a dietitian to create an eating plan that's best for you.  Weigh yourself each day.   Call your provider if you gain more than 3 lb in 24 hours, or more than 5 lb in a week.  Health care provider name: Toyin [facility] care provider phone number: [phone]

## 2025-04-15 ENCOUNTER — HOSPITAL ENCOUNTER (OUTPATIENT)
Dept: CARDIOLOGY | Facility: HOSPITAL | Age: 85
Discharge: HOME | End: 2025-04-15
Admitting: INTERNAL MEDICINE
Payer: MEDICARE

## 2025-04-15 VITALS
OXYGEN SATURATION: 94 % | DIASTOLIC BLOOD PRESSURE: 77 MMHG | BODY MASS INDEX: 25.48 KG/M2 | HEART RATE: 79 BPM | SYSTOLIC BLOOD PRESSURE: 109 MMHG | HEIGHT: 62 IN | WEIGHT: 138.45 LBS | RESPIRATION RATE: 19 BRPM

## 2025-04-15 DIAGNOSIS — I34.0 MITRAL VALVE INSUFFICIENCY, UNSPECIFIED ETIOLOGY: ICD-10-CM

## 2025-04-15 DIAGNOSIS — I50.32 CHRONIC HEART FAILURE WITH PRESERVED EJECTION FRACTION (HFPEF): ICD-10-CM

## 2025-04-15 PROBLEM — E11.65 TYPE 2 DIABETES MELLITUS WITH HYPERGLYCEMIA, WITHOUT LONG-TERM CURRENT USE OF INSULIN: Status: ACTIVE | Noted: 2025-04-15

## 2025-04-15 PROCEDURE — 93319 3D ECHO IMG CGEN CAR ANOMAL: CPT

## 2025-04-15 PROCEDURE — 93320 DOPPLER ECHO COMPLETE: CPT

## 2025-04-15 PROCEDURE — 25010000002 MIDAZOLAM PER 1 MG: Performed by: INTERNAL MEDICINE

## 2025-04-15 PROCEDURE — 25010000002 FENTANYL CITRATE (PF) 50 MCG/ML SOLUTION: Performed by: INTERNAL MEDICINE

## 2025-04-15 RX ORDER — MIDAZOLAM HYDROCHLORIDE 1 MG/ML
INJECTION, SOLUTION INTRAMUSCULAR; INTRAVENOUS
Status: COMPLETED | OUTPATIENT
Start: 2025-04-15 | End: 2025-04-15

## 2025-04-15 RX ORDER — ETOMIDATE 2 MG/ML
INJECTION INTRAVENOUS
Status: COMPLETED | OUTPATIENT
Start: 2025-04-15 | End: 2025-04-15

## 2025-04-15 RX ORDER — FENTANYL CITRATE 50 UG/ML
INJECTION, SOLUTION INTRAMUSCULAR; INTRAVENOUS
Status: COMPLETED | OUTPATIENT
Start: 2025-04-15 | End: 2025-04-15

## 2025-04-15 RX ADMIN — ETOMIDATE 6 MG: 20 INJECTION, SOLUTION INTRAVENOUS at 13:17

## 2025-04-15 RX ADMIN — ETOMIDATE 3 MG: 20 INJECTION, SOLUTION INTRAVENOUS at 13:45

## 2025-04-15 RX ADMIN — FENTANYL CITRATE 50 MCG: 50 INJECTION, SOLUTION INTRAMUSCULAR; INTRAVENOUS at 13:09

## 2025-04-15 RX ADMIN — MIDAZOLAM 2 MG: 1 INJECTION INTRAMUSCULAR; INTRAVENOUS at 13:09

## 2025-04-16 ENCOUNTER — DOCUMENTATION (OUTPATIENT)
Dept: CARDIOLOGY | Facility: HOSPITAL | Age: 85
End: 2025-04-16
Payer: MEDICARE

## 2025-04-16 NOTE — PROGRESS NOTES
Requested that YARIEL images be reviewed by [facility] clinical specialists for NED/MitraClip candidacy

## 2025-04-17 LAB
BH CV ECHO MEAS - LVOT AREA: 3.5 CM2
BH CV ECHO MEAS - LVOT DIAM: 2.1 CM
BH CV ECHO MEAS - MV MAX PG: 6.1 MMHG
BH CV ECHO MEAS - MV MEAN PG: 2 MMHG
BH CV ECHO MEAS - MV V2 VTI: 18.5 CM
BH CV ECHO MEAS - RAP SYSTOLE: 8 MMHG
BH CV ECHO MEAS - RVSP: 23 MMHG
BH CV ECHO MEAS - TR MAX PG: 14.7 MMHG
BH CV ECHO MEAS - TR MAX VEL: 192 CM/SEC
BH CV VAS BP RIGHT ARM: NORMAL MMHG
LV EF 2D ECHO EST: 50 %
MV VALVE AREA BY PLANIMETRY: 4.14 CM2

## 2025-04-21 ENCOUNTER — TELEPHONE (OUTPATIENT)
Dept: CARDIOLOGY | Facility: CLINIC | Age: 85
End: 2025-04-21
Payer: MEDICARE

## 2025-04-21 DIAGNOSIS — I34.0 MITRAL VALVE INSUFFICIENCY, UNSPECIFIED ETIOLOGY: Primary | ICD-10-CM

## 2025-04-21 NOTE — TELEPHONE ENCOUNTER
Discussed the patient with Dr. Thomas.     Mitral valve area is 3.7 to 4.1 cm² by 2D planimetry.  Patient is not an ideal candidate for MitraClip at this point.     Decision made to initiate antiarrhythmic therapy followed by cardioversion with hope of maintaining sinus rhythm to help with patient's symptoms.     Given the fact that patient has heart failure with preserved ejection fraction with normal kidney function, could consider admitting her to the hospital for Tikosyn initiation followed by cardioversion.     Will discuss with Charli Ibanez and the patient and make the final plan.  Patient is currently scheduled to see Charli on 4/28/2025.

## 2025-04-24 ENCOUNTER — TELEPHONE (OUTPATIENT)
Dept: CARDIOLOGY | Facility: CLINIC | Age: 85
End: 2025-04-24
Payer: MEDICARE

## 2025-04-24 NOTE — TELEPHONE ENCOUNTER
Patient left voice mail message.  She states [facility] was going to prescribe her something for afib and she hasn't heard anything.  Her pharmacy is [facility] in [place].  Reviewed chart.  It appears Sania Dobbs MD and MARY Mackenzie are going to discuss the plan with the patient.  I will send them a staff message.

## 2025-04-25 ENCOUNTER — DOCUMENTATION (OUTPATIENT)
Dept: CARDIOLOGY | Facility: HOSPITAL | Age: 85
End: 2025-04-25
Payer: MEDICARE

## 2025-04-25 RX ORDER — FUROSEMIDE 40 MG/1
40 TABLET ORAL DAILY
Qty: 90 TABLET | Refills: 1 | Status: SHIPPED | OUTPATIENT
Start: 2025-04-25

## 2025-04-25 NOTE — PROGRESS NOTES
Patient deemed not a NED/MitraClip candidate per discussion with Dr. Thomas and primary cardiology team.

## 2025-04-28 ENCOUNTER — OFFICE VISIT (OUTPATIENT)
Dept: CARDIOLOGY | Facility: CLINIC | Age: 85
End: 2025-04-28
Payer: MEDICARE

## 2025-04-28 VITALS
OXYGEN SATURATION: 95 % | DIASTOLIC BLOOD PRESSURE: 68 MMHG | WEIGHT: 138.4 LBS | BODY MASS INDEX: 25.47 KG/M2 | HEIGHT: 62 IN | HEART RATE: 81 BPM | SYSTOLIC BLOOD PRESSURE: 103 MMHG

## 2025-04-28 DIAGNOSIS — I48.19 PERSISTENT ATRIAL FIBRILLATION: ICD-10-CM

## 2025-04-28 DIAGNOSIS — I50.32 CHRONIC HEART FAILURE WITH PRESERVED EJECTION FRACTION (HFPEF): Primary | ICD-10-CM

## 2025-04-28 NOTE — PROGRESS NOTES
Janie Reid PA  Suellen Matamoros  1940 04/28/2025    Patient Active Problem List   Diagnosis    Borderline hypertension    Premature atrial beats    Abnormal EKG    Persistent atrial fibrillation with a [id number] score of 5    Moderate to severe mitral regurgitation    Chronic heart failure with preserved ejection fraction (HFpEF)    Type 2 diabetes mellitus with hyperglycemia, without long-term current use of insulin       Dear Janie Reid PA:    Subjective     History of Present Illness:    Chief Complaint   Patient presents with    Follow-up     ROUTINE       Suellen Matamoros is a pleasant 84 y.o. female with a past medical history significant for persistent atrial fibrillation with [id number] score of 5, moderate to severe mitral valve regurgitation, diabetes mellitus, essential hypertension. She comes in today for cardiology follow-up.     Radha comes in today stating that she has been stable. She reports her breathing is still much improved since she was hospitalized and diuresed. She reports that it is relatively unchanged since I saw her in march. Since she was last seen she did have YARIEL by Dr. Thomas but unfortunately was deemed not to be a good candidate for a mitraclip.  She is back in atrial fibrillation but denies any awareness of arrhythmia today.  She reports compliance with Eliquis and denies any bleeding issues.  Blood pressure is well-controlled today      No Known Allergies:      Current Outpatient Medications:     ALPHA LIPOIC ACID PO, Take  by mouth As Needed., Disp: , Rfl:     apixaban (ELIQUIS) 5 MG tablet tablet, Take 1 tablet by mouth 2 (Two) Times a Day., Disp: 60 tablet, Rfl: 2    Ascorbic Acid (VITAMIN C PO), Take 250 mg by mouth Daily., Disp: , Rfl:     Barberry-Oreg Grape-Goldenseal (BERBERINE COMPLEX PO), Take 1 capsule by mouth Daily., Disp: , Rfl:     Calcium Carb-Cholecalciferol (CALCIUM 500+D PO), Take 1 capsule by mouth Daily., Disp: , Rfl:  "    Cholecalciferol (VITAMIN D-3 PO), Take 400 Units by mouth Daily., Disp: , Rfl:     furosemide (Lasix) 40 MG tablet, Take 1 tablet by mouth Daily., Disp: 90 tablet, Rfl: 1    metFORMIN ER (GLUCOPHAGE-XR) 500 MG 24 hr tablet, Take 1 tablet by mouth Daily With Breakfast., Disp: , Rfl:     metoprolol succinate XL (TOPROL-XL) 25 MG 24 hr tablet, Take 1 tablet by mouth Every 12 (Twelve) Hours. (Patient taking differently: Take 1 tablet by mouth Every 12 (Twelve) Hours. 1/2 tablet bid), Disp: 90 tablet, Rfl: 2    Multiple Vitamins-Minerals (ICAPS AREDS 2 PO), Take 1 capsule by mouth Daily., Disp: , Rfl:     multivitamin (MULTI VITAMIN PO), Take 1 tablet by mouth Daily., Disp: , Rfl:     multivitamin with minerals (VISION FORMULA PO), Take 1 tablet by mouth Daily., Disp: , Rfl:     Omega-3 Fatty Acids (OMEGA 3 PO), Take 1,000 Units by mouth Daily., Disp: , Rfl:     zinc sulfate (ZINCATE) 220 (50 Zn) MG capsule, Take 1 capsule by mouth Daily., Disp: , Rfl:     The following portions of the patient's history were reviewed and updated as appropriate: allergies, current medications, past family history, past medical history, past social history, past surgical history and problem list.    Social History     Tobacco Use    Smoking status: Never     Passive exposure: Past    Smokeless tobacco: Never   Vaping Use    Vaping status: Never Used   Substance Use Topics    Alcohol use: Never    Drug use: Never         Objective   Vitals:    04/28/25 1506   BP: 103/68   Pulse: 81   SpO2: 95%   Weight: 62.8 kg (138 lb 6.4 oz)   Height: 157.5 cm (62\")     Body mass index is 25.31 kg/m².    ROS    Constitutional:       General: Not in acute distress.     Appearance: Healthy appearance. Well-developed and not in distress. Not diaphoretic.   Eyes:      Conjunctiva/sclera: Conjunctivae normal.      Pupils: Pupils are equal, round, and reactive to light.   HENT:      Head: Normocephalic and atraumatic.   Neck:      Vascular: No carotid bruit " "or JVD.   Pulmonary:      Effort: Pulmonary effort is normal. No respiratory distress.      Breath sounds: Normal breath sounds.   Cardiovascular:      Normal rate. Regular rhythm.   Edema:     Peripheral edema absent.   Skin:     General: Skin is cool.   Neurological:      Mental Status: Alert, oriented to person, place, and time and oriented to person, place and time.         Lab Results   Component Value Date     04/14/2025    K 4.3 04/14/2025     04/14/2025    CO2 24.8 04/14/2025    BUN 23 04/14/2025    CREATININE 0.74 04/14/2025    GLUCOSE 125 (H) 04/14/2025    CALCIUM 9.4 04/14/2025    AST 36 (H) 03/07/2025    ALT 42 (H) 03/07/2025    ALKPHOS 76 03/07/2025     No results found for: \"CKTOTAL\"  Lab Results   Component Value Date    WBC 7.17 03/07/2025    HGB 12.0 03/07/2025    HCT 38.7 03/07/2025     03/07/2025     Lab Results   Component Value Date    INR 1.25 (H) 03/06/2025     Lab Results   Component Value Date    MG 2.1 04/14/2025     Lab Results   Component Value Date    TSH 1.230 03/06/2025      No results found for: \"BNP\"    During this visit the following were done:  Labs Reviewed []    Labs Ordered []    Radiology Reports Reviewed []    Radiology Ordered []    PCP Records Reviewed []    Referring Provider Records Reviewed []    ER Records Reviewed []    Hospital Records Reviewed []    History Obtained From Family []    Radiology Images Reviewed []    Other Reviewed []    Records Requested []       Procedures    Assessment & Plan    Diagnosis Plan   1. Chronic heart failure with preserved ejection fraction (HFpEF)        2. Persistent atrial fibrillation with a YHC9CR5-GYCp score of 5                 Recommendations:  Chronic HFpEF  Appears euvolemic today denies any worsening shortness of breath from baseline much improved from her recent hospitalization.  I do think her acute heart failure was likely exacerbated from some underlying atrial fibrillation and may benefit from rhythm " control.  May also be from her mitral valve disease she is not a candidate for MitraClip per Dr. Thomas after YARIEL was performed.  I did explain this to her.  Persistent atrial fibrillation  Discussed options we did recommend starting amiodarone and reperforming cardioversion.  However after discussing possible side effects from amiodarone use she is reluctant to start this.  She is more interested in considering an ablation so I will refer her to electrophysiology.  Discussed the case with Dr. Dobbs since she is interested in ablation we will hold off on starting amiodarone as this may affect future ablation.  Moderate to severe MR  Will continue to monitor if she continues to have recurrent acute heart failure will consider referral for replacement.  However is currently stable so we will hold off for now.    No follow-ups on file.    As always, I appreciate very much the opportunity to participate in the cardiovascular care of your patients.      With Best Regards,    Yo Ibanez PA-C

## 2025-05-12 ENCOUNTER — TELEPHONE (OUTPATIENT)
Dept: CARDIOLOGY | Facility: CLINIC | Age: 85
End: 2025-05-12
Payer: MEDICARE

## 2025-05-12 NOTE — TELEPHONE ENCOUNTER
"  Caller: Suellen Matamoros \"Radha\"    Relationship: Self    Best call back number: [phone]    What is the best time to reach you: ANY    Who are you requesting to speak with (clinical staff, provider,  specific staff member): CLINICAL      What was the call regarding: PATIENT CALLED TO LET MARIA ELENA KNOW THAT SHE WANTS TO PROCEED WITH THE CARDIOVERSION, SO SHE WOULD LIKE TO START TAKING THE MEDICATION THAT SHE NEEDS TO TAKE PRIOR TO THE PROCEDURE. PLEASE SEND TO [facility] [place] OR CALL PATIENT WITH ANY QUESTIONS. THANK YOU    Is it okay if the provider responds through [employer]: PLEASE CALL      "

## 2025-05-12 NOTE — TELEPHONE ENCOUNTER
I had spoken to Dr. Dobbs if we are going to consider an ablation we should not start that medication

## 2025-05-12 NOTE — TELEPHONE ENCOUNTER
Called pt and advised her. She wanted to know is that they will order at her apt in July or will we order it?

## 2025-05-13 NOTE — TELEPHONE ENCOUNTER
That would be at the discretion of the cardiologist she will be seeing, Dr. Juarez when he sees her. It will not be the same day she sees him. I actually want to see her after her visit with him, not before.

## 2025-05-29 ENCOUNTER — TELEPHONE (OUTPATIENT)
Dept: CARDIOLOGY | Facility: CLINIC | Age: 85
End: 2025-05-29
Payer: MEDICARE

## 2025-05-29 NOTE — TELEPHONE ENCOUNTER
Patient's relative faith called and stated he spoke with you during her last appointment a referral to see Dr. Juarez in [place] and how you told them if it takes to long for the referral you would schedule patient for a cardioversion, he said it would be in July before they could see her and wanted to know if you could order the cardioversion and get her scheduled.  I told him I would have to check with you and get back with them.

## 2025-06-02 NOTE — TELEPHONE ENCOUNTER
Patient  called back and said that she would try to come in tomorrow to see Dr. Dobbs, she will call us back if she is unable to make this appt.

## 2025-06-02 NOTE — TELEPHONE ENCOUNTER
I had spoken to Dr. Dobbs regarding this he would really prefer to avoiding amiodarone prior to the visit with the EP.  In doing cardioversion without amiodarone would likely be unsuccessful.  If she is having symptoms we can schedule her to be seen sooner however I would like her to see Dr. Dobbs.

## 2025-06-02 NOTE — TELEPHONE ENCOUNTER
"      Caller: Suellen Matamoros \"Radha\"    Relationship to patient: Self    Best call back number: [phone]    Patient is needing: PATIENT RETURNING CALL TO [facility]. WT TO THE OFFICE        "

## 2025-06-03 ENCOUNTER — OFFICE VISIT (OUTPATIENT)
Dept: CARDIOLOGY | Facility: CLINIC | Age: 85
End: 2025-06-03
Payer: MEDICARE

## 2025-06-03 VITALS
BODY MASS INDEX: 25.83 KG/M2 | WEIGHT: 140.4 LBS | HEIGHT: 62 IN | OXYGEN SATURATION: 95 % | DIASTOLIC BLOOD PRESSURE: 70 MMHG | SYSTOLIC BLOOD PRESSURE: 114 MMHG | HEART RATE: 88 BPM

## 2025-06-03 DIAGNOSIS — I10 ESSENTIAL HYPERTENSION: ICD-10-CM

## 2025-06-03 DIAGNOSIS — I34.0 MODERATE TO SEVERE MITRAL REGURGITATION: ICD-10-CM

## 2025-06-03 DIAGNOSIS — I48.19 PERSISTENT ATRIAL FIBRILLATION: Primary | ICD-10-CM

## 2025-06-03 DIAGNOSIS — E11.65 TYPE 2 DIABETES MELLITUS WITH HYPERGLYCEMIA, WITHOUT LONG-TERM CURRENT USE OF INSULIN: ICD-10-CM

## 2025-06-03 DIAGNOSIS — I50.32 CHRONIC HEART FAILURE WITH PRESERVED EJECTION FRACTION (HFPEF): ICD-10-CM

## 2025-06-07 NOTE — H&P (VIEW-ONLY)
Janie Reid PA  Suellen Matamoros  1940 04/28/2025    Patient Active Problem List   Diagnosis    Borderline hypertension    Premature atrial beats    Abnormal EKG    Persistent atrial fibrillation with a UUU2XS2-TZUi score of 5    Moderate to severe mitral regurgitation    Chronic heart failure with preserved ejection fraction (HFpEF)    Type 2 diabetes mellitus with hyperglycemia, without long-term current use of insulin       Dear Janie Reid PA:    Subjective     History of Present Illness:    Chief Complaint   Patient presents with    Follow-up       Suellen Matamoros is a pleasant 84 y.o. female with a past medical history significant for persistent atrial fibrillation with [id number] score of 5, moderate to severe mitral valve regurgitation, diabetes mellitus, essential hypertension. She comes in today for cardiology follow-up.     Patient reports she has been feeling better since she was hospitalized and diuresed. She did have YARIEL by Dr. Thomas but was deemed not to be a good candidate for a mitraclip. Discussed the possibility of proceeding with cardioversion only without antiarrythmic therapy to restore sinus rhythm. Patient missed a dose of eliquis in last week. Will plan for the procedure in 4 weeks.       No Known Allergies:      Current Outpatient Medications:     ALPHA LIPOIC ACID PO, Take  by mouth As Needed., Disp: , Rfl:     apixaban (ELIQUIS) 5 MG tablet tablet, Take 1 tablet by mouth 2 (Two) Times a Day., Disp: 60 tablet, Rfl: 2    Ascorbic Acid (VITAMIN C PO), Take 250 mg by mouth Daily., Disp: , Rfl:     Barberry-Oreg Grape-Goldenseal (BERBERINE COMPLEX PO), Take 1 capsule by mouth Daily., Disp: , Rfl:     Calcium Carb-Cholecalciferol (CALCIUM 500+D PO), Take 1 capsule by mouth Daily., Disp: , Rfl:     Cholecalciferol (VITAMIN D-3 PO), Take 400 Units by mouth Daily., Disp: , Rfl:     furosemide (Lasix) 40 MG tablet, Take 1 tablet by mouth Daily., Disp: 90  "tablet, Rfl: 1    metFORMIN ER (GLUCOPHAGE-XR) 500 MG 24 hr tablet, Take 1 tablet by mouth Daily With Breakfast., Disp: , Rfl:     metoprolol succinate XL (TOPROL-XL) 25 MG 24 hr tablet, Take 1 tablet by mouth Every 12 (Twelve) Hours. (Patient taking differently: Take 1 tablet by mouth Every 12 (Twelve) Hours. 1/2 tablet bid), Disp: 90 tablet, Rfl: 2    Multiple Vitamins-Minerals (ICAPS AREDS 2 PO), Take 1 capsule by mouth Daily., Disp: , Rfl:     multivitamin (MULTI VITAMIN PO), Take 1 tablet by mouth Daily., Disp: , Rfl:     multivitamin with minerals (VISION FORMULA PO), Take 1 tablet by mouth Daily., Disp: , Rfl:     Omega-3 Fatty Acids (OMEGA 3 PO), Take 1,000 Units by mouth Daily., Disp: , Rfl:     zinc sulfate (ZINCATE) 220 (50 Zn) MG capsule, Take 1 capsule by mouth Daily., Disp: , Rfl:     The following portions of the patient's history were reviewed and updated as appropriate: allergies, current medications, past family history, past medical history, past social history, past surgical history and problem list.    Social History     Tobacco Use    Smoking status: Never     Passive exposure: Past    Smokeless tobacco: Never   Vaping Use    Vaping status: Never Used   Substance Use Topics    Alcohol use: Never    Drug use: Never         Objective   Vitals:    06/03/25 1501   BP: 114/70   BP Location: Left arm   Patient Position: Sitting   Cuff Size: Adult   Pulse: 88   SpO2: 95%   Weight: 63.7 kg (140 lb 6.4 oz)   Height: 157.5 cm (62.01\")     Body mass index is 25.67 kg/m².    ROS    Constitutional:       General: Not in acute distress.     Appearance: Healthy appearance. Well-developed and not in distress. Not diaphoretic.   Eyes:      Conjunctiva/sclera: Conjunctivae normal.      Pupils: Pupils are equal, round, and reactive to light.   HENT:      Head: Normocephalic and atraumatic.   Neck:      Vascular: No carotid bruit or JVD.   Pulmonary:      Effort: Pulmonary effort is normal. No respiratory " "distress.      Breath sounds: Normal breath sounds.   Cardiovascular:      Normal rate. Irregularly irregular rhythm.   Edema:     Peripheral edema absent.   Skin:     General: Skin is cool.   Neurological:      Mental Status: Alert, oriented to person, place, and time and oriented to person, place and time.         Lab Results   Component Value Date     04/14/2025    K 4.3 04/14/2025     04/14/2025    CO2 24.8 04/14/2025    BUN 23 04/14/2025    CREATININE 0.74 04/14/2025    GLUCOSE 125 (H) 04/14/2025    CALCIUM 9.4 04/14/2025    AST 36 (H) 03/07/2025    ALT 42 (H) 03/07/2025    ALKPHOS 76 03/07/2025     No results found for: \"CKTOTAL\"  Lab Results   Component Value Date    WBC 7.17 03/07/2025    HGB 12.0 03/07/2025    HCT 38.7 03/07/2025     03/07/2025     Lab Results   Component Value Date    INR 1.25 (H) 03/06/2025     Lab Results   Component Value Date    MG 2.1 04/14/2025     Lab Results   Component Value Date    TSH 1.230 03/06/2025      No results found for: \"BNP\"    During this visit the following were done:  Labs Reviewed [x]    Labs Ordered []    Radiology Reports Reviewed [x]    Radiology Ordered []    PCP Records Reviewed []    Referring Provider Records Reviewed []    ER Records Reviewed []    Hospital Records Reviewed []    History Obtained From Family []    Radiology Images Reviewed []    Other Reviewed []    Records Requested []       Procedures    Assessment & Plan    Diagnosis Plan   1. Persistent atrial fibrillation with a IKQ1DH2-SZXp score of 5  NPO Diet NPO Type: Strict NPO    Basic Metabolic Panel    Protime-INR    Cardioversion External in Cardiology Department    ECG 12 Lead Pre-Op / Pre-Procedure      2. Moderate to severe mitral regurgitation  NPO Diet NPO Type: Strict NPO    Basic Metabolic Panel    Protime-INR    Cardioversion External in Cardiology Department    ECG 12 Lead Pre-Op / Pre-Procedure      3. Type 2 diabetes mellitus with hyperglycemia, without long-term " current use of insulin  NPO Diet NPO Type: Strict NPO    Basic Metabolic Panel    Protime-INR    Cardioversion External in Cardiology Department    ECG 12 Lead Pre-Op / Pre-Procedure      4. Chronic heart failure with preserved ejection fraction (HFpEF)  NPO Diet NPO Type: Strict NPO    Basic Metabolic Panel    Protime-INR    Cardioversion External in Cardiology Department    ECG 12 Lead Pre-Op / Pre-Procedure      5. Essential hypertension  NPO Diet NPO Type: Strict NPO    Basic Metabolic Panel    Protime-INR    Cardioversion External in Cardiology Department    ECG 12 Lead Pre-Op / Pre-Procedure               Recommendations:  Chronic HFpEF  Appears euvolemic today denies any worsening shortness of breath from baseline much improved from her recent hospitalization.    She is not a candidate for MitraClip based on mitral valve area calculated on YARIEL.  I did explain this to her.  Persistent atrial fibrillation  Discussed options including re-performing cardioversion without amiodarone since might be a candidate for A fib ablation and does not want long term amiodarone therapy due to significant side affects and is waiting to see Dr. Juaerz from EP. Rate and symptoms are controlled on current dose of metoprolol.  Patient missed a dose of eliquis in last week. Will plan for the DCCV procedure in 4 weeks.   Moderate to severe MR  Will continue medical management.    Return in about 8 weeks (around 7/29/2025).    As always, I appreciate very much the opportunity to participate in the cardiovascular care of your patients.      With Best Regards,    Sania Dobbs MD

## 2025-06-07 NOTE — PROGRESS NOTES
Janie Reid PA  Suellen Matamoros  1940 04/28/2025    Patient Active Problem List   Diagnosis    Borderline hypertension    Premature atrial beats    Abnormal EKG    Persistent atrial fibrillation with a [id number] score of 5    Moderate to severe mitral regurgitation    Chronic heart failure with preserved ejection fraction (HFpEF)    Type 2 diabetes mellitus with hyperglycemia, without long-term current use of insulin       Dear Janie Reid PA:    Subjective     History of Present Illness:    Chief Complaint   Patient presents with    Follow-up       Suellen Matamorso is a pleasant 84 y.o. female with a past medical history significant for persistent atrial fibrillation with [id number] score of 5, moderate to severe mitral valve regurgitation, diabetes mellitus, essential hypertension. She comes in today for cardiology follow-up.     Patient reports she has been feeling better since she was hospitalized and diuresed. She did have YARIEL by Dr. Thomas but was deemed not to be a good candidate for a mitraclip. Discussed the possibility of proceeding with cardioversion only without antiarrythmic therapy to restore sinus rhythm. Patient missed a dose of eliquis in last week. Will plan for the procedure in 4 weeks.       No Known Allergies:      Current Outpatient Medications:     ALPHA LIPOIC ACID PO, Take  by mouth As Needed., Disp: , Rfl:     apixaban (ELIQUIS) 5 MG tablet tablet, Take 1 tablet by mouth 2 (Two) Times a Day., Disp: 60 tablet, Rfl: 2    Ascorbic Acid (VITAMIN C PO), Take 250 mg by mouth Daily., Disp: , Rfl:     Barberry-Oreg Grape-Goldenseal (BERBERINE COMPLEX PO), Take 1 capsule by mouth Daily., Disp: , Rfl:     Calcium Carb-Cholecalciferol (CALCIUM 500+D PO), Take 1 capsule by mouth Daily., Disp: , Rfl:     Cholecalciferol (VITAMIN D-3 PO), Take 400 Units by mouth Daily., Disp: , Rfl:     furosemide (Lasix) 40 MG tablet, Take 1 tablet by mouth Daily., Disp: 90  "tablet, Rfl: 1    metFORMIN ER (GLUCOPHAGE-XR) 500 MG 24 hr tablet, Take 1 tablet by mouth Daily With Breakfast., Disp: , Rfl:     metoprolol succinate XL (TOPROL-XL) 25 MG 24 hr tablet, Take 1 tablet by mouth Every 12 (Twelve) Hours. (Patient taking differently: Take 1 tablet by mouth Every 12 (Twelve) Hours. 1/2 tablet bid), Disp: 90 tablet, Rfl: 2    Multiple Vitamins-Minerals (ICAPS AREDS 2 PO), Take 1 capsule by mouth Daily., Disp: , Rfl:     multivitamin (MULTI VITAMIN PO), Take 1 tablet by mouth Daily., Disp: , Rfl:     multivitamin with minerals (VISION FORMULA PO), Take 1 tablet by mouth Daily., Disp: , Rfl:     Omega-3 Fatty Acids (OMEGA 3 PO), Take 1,000 Units by mouth Daily., Disp: , Rfl:     zinc sulfate (ZINCATE) 220 (50 Zn) MG capsule, Take 1 capsule by mouth Daily., Disp: , Rfl:     The following portions of the patient's history were reviewed and updated as appropriate: allergies, current medications, past family history, past medical history, past social history, past surgical history and problem list.    Social History     Tobacco Use    Smoking status: Never     Passive exposure: Past    Smokeless tobacco: Never   Vaping Use    Vaping status: Never Used   Substance Use Topics    Alcohol use: Never    Drug use: Never         Objective   Vitals:    06/03/25 1501   BP: 114/70   BP Location: Left arm   Patient Position: Sitting   Cuff Size: Adult   Pulse: 88   SpO2: 95%   Weight: 63.7 kg (140 lb 6.4 oz)   Height: 157.5 cm (62.01\")     Body mass index is 25.67 kg/m².    ROS    Constitutional:       General: Not in acute distress.     Appearance: Healthy appearance. Well-developed and not in distress. Not diaphoretic.   Eyes:      Conjunctiva/sclera: Conjunctivae normal.      Pupils: Pupils are equal, round, and reactive to light.   HENT:      Head: Normocephalic and atraumatic.   Neck:      Vascular: No carotid bruit or JVD.   Pulmonary:      Effort: Pulmonary effort is normal. No respiratory " "distress.      Breath sounds: Normal breath sounds.   Cardiovascular:      Normal rate. Irregularly irregular rhythm.   Edema:     Peripheral edema absent.   Skin:     General: Skin is cool.   Neurological:      Mental Status: Alert, oriented to person, place, and time and oriented to person, place and time.         Lab Results   Component Value Date     04/14/2025    K 4.3 04/14/2025     04/14/2025    CO2 24.8 04/14/2025    BUN 23 04/14/2025    CREATININE 0.74 04/14/2025    GLUCOSE 125 (H) 04/14/2025    CALCIUM 9.4 04/14/2025    AST 36 (H) 03/07/2025    ALT 42 (H) 03/07/2025    ALKPHOS 76 03/07/2025     No results found for: \"CKTOTAL\"  Lab Results   Component Value Date    WBC 7.17 03/07/2025    HGB 12.0 03/07/2025    HCT 38.7 03/07/2025     03/07/2025     Lab Results   Component Value Date    INR 1.25 (H) 03/06/2025     Lab Results   Component Value Date    MG 2.1 04/14/2025     Lab Results   Component Value Date    TSH 1.230 03/06/2025      No results found for: \"BNP\"    During this visit the following were done:  Labs Reviewed [x]    Labs Ordered []    Radiology Reports Reviewed [x]    Radiology Ordered []    PCP Records Reviewed []    Referring Provider Records Reviewed []    ER Records Reviewed []    Hospital Records Reviewed []    History Obtained From Family []    Radiology Images Reviewed []    Other Reviewed []    Records Requested []       Procedures    Assessment & Plan    Diagnosis Plan   1. Persistent atrial fibrillation with a [id number] score of 5  NPO Diet NPO Type: Strict NPO    Basic Metabolic Panel    Protime-INR    Cardioversion External in Cardiology Department    ECG 12 Lead Pre-Op / Pre-Procedure      2. Moderate to severe mitral regurgitation  NPO Diet NPO Type: Strict NPO    Basic Metabolic Panel    Protime-INR    Cardioversion External in Cardiology Department    ECG 12 Lead Pre-Op / Pre-Procedure      3. Type 2 diabetes mellitus with hyperglycemia, without long-term " current use of insulin  NPO Diet NPO Type: Strict NPO    Basic Metabolic Panel    Protime-INR    Cardioversion External in Cardiology Department    ECG 12 Lead Pre-Op / Pre-Procedure      4. Chronic heart failure with preserved ejection fraction (HFpEF)  NPO Diet NPO Type: Strict NPO    Basic Metabolic Panel    Protime-INR    Cardioversion External in Cardiology Department    ECG 12 Lead Pre-Op / Pre-Procedure      5. Essential hypertension  NPO Diet NPO Type: Strict NPO    Basic Metabolic Panel    Protime-INR    Cardioversion External in Cardiology Department    ECG 12 Lead Pre-Op / Pre-Procedure               Recommendations:  Chronic HFpEF  Appears euvolemic today denies any worsening shortness of breath from baseline much improved from her recent hospitalization.    She is not a candidate for MitraClip based on mitral valve area calculated on YARIEL.  I did explain this to her.  Persistent atrial fibrillation  Discussed options including re-performing cardioversion without amiodarone since might be a candidate for A fib ablation and does not want long term amiodarone therapy due to significant side affects and is waiting to see Dr. Juarez from EP. Rate and symptoms are controlled on current dose of metoprolol.  Patient missed a dose of eliquis in last week. Will plan for the DCCV procedure in 4 weeks.   Moderate to severe MR  Will continue medical management.    Return in about 8 weeks (around 7/29/2025).    As always, I appreciate very much the opportunity to participate in the cardiovascular care of your patients.      With Best Regards,    Sania Dobbs MD

## 2025-06-09 ENCOUNTER — HOSPITAL ENCOUNTER (OUTPATIENT)
Dept: CARDIOLOGY | Facility: HOSPITAL | Age: 85
Discharge: HOME | End: 2025-06-09
Admitting: PHYSICIAN ASSISTANT
Payer: MEDICARE

## 2025-06-09 VITALS
BODY MASS INDEX: 25.98 KG/M2 | HEIGHT: 62 IN | SYSTOLIC BLOOD PRESSURE: 112 MMHG | OXYGEN SATURATION: 97 % | DIASTOLIC BLOOD PRESSURE: 70 MMHG | HEART RATE: 50 BPM | WEIGHT: 141.2 LBS

## 2025-06-09 DIAGNOSIS — I48.19 PERSISTENT ATRIAL FIBRILLATION: ICD-10-CM

## 2025-06-09 DIAGNOSIS — I34.0 MODERATE TO SEVERE MITRAL REGURGITATION: ICD-10-CM

## 2025-06-09 DIAGNOSIS — E61.1 IRON DEFICIENCY: ICD-10-CM

## 2025-06-09 DIAGNOSIS — I50.32 CHRONIC HEART FAILURE WITH PRESERVED EJECTION FRACTION (HFPEF): Primary | ICD-10-CM

## 2025-06-09 LAB
ABSOLUTE LUNG FLUID CONTENT: 36 % (ref 20–35)
ANION GAP SERPL CALCULATED.3IONS-SCNC: 12.3 MMOL/L (ref 5–15)
BUN SERPL-MCNC: 26.8 MG/DL (ref 8–23)
BUN/CREAT SERPL: 41.9 (ref 7–25)
CALCIUM SPEC-SCNC: 8.9 MG/DL (ref 8.6–10.5)
CHLORIDE SERPL-SCNC: 106 MMOL/L (ref 98–107)
CO2 SERPL-SCNC: 20.7 MMOL/L (ref 22–29)
CREAT SERPL-MCNC: 0.64 MG/DL (ref 0.57–1)
EGFRCR SERPLBLD CKD-EPI 2021: 87.8 ML/MIN/1.73
FERRITIN SERPL-MCNC: 63.8 NG/ML (ref 13–150)
GLUCOSE SERPL-MCNC: 142 MG/DL (ref 65–99)
IRON 24H UR-MRATE: 40 MCG/DL (ref 37–145)
IRON SATN MFR SERPL: 11 % (ref 20–50)
MAGNESIUM SERPL-MCNC: 2.1 MG/DL (ref 1.6–2.4)
NT-PROBNP SERPL-MCNC: 1454 PG/ML (ref 0–1800)
POTASSIUM SERPL-SCNC: 4.7 MMOL/L (ref 3.5–5.2)
SODIUM SERPL-SCNC: 139 MMOL/L (ref 136–145)
TIBC SERPL-MCNC: 358 MCG/DL (ref 298–536)
TRANSFERRIN SERPL-MCNC: 240 MG/DL (ref 200–360)

## 2025-06-09 PROCEDURE — 84466 ASSAY OF TRANSFERRIN: CPT | Performed by: PHYSICIAN ASSISTANT

## 2025-06-09 PROCEDURE — G2211 COMPLEX E/M VISIT ADD ON: HCPCS | Performed by: PHYSICIAN ASSISTANT

## 2025-06-09 PROCEDURE — 83735 ASSAY OF MAGNESIUM: CPT | Performed by: PHYSICIAN ASSISTANT

## 2025-06-09 PROCEDURE — 83880 ASSAY OF NATRIURETIC PEPTIDE: CPT | Performed by: PHYSICIAN ASSISTANT

## 2025-06-09 PROCEDURE — 82728 ASSAY OF FERRITIN: CPT | Performed by: PHYSICIAN ASSISTANT

## 2025-06-09 PROCEDURE — 80048 BASIC METABOLIC PNL TOTAL CA: CPT | Performed by: PHYSICIAN ASSISTANT

## 2025-06-09 PROCEDURE — 83540 ASSAY OF IRON: CPT | Performed by: PHYSICIAN ASSISTANT

## 2025-06-09 PROCEDURE — 99215 OFFICE O/P EST HI 40 MIN: CPT | Performed by: PHYSICIAN ASSISTANT

## 2025-06-09 RX ORDER — FAMOTIDINE 10 MG/ML
20 INJECTION, SOLUTION INTRAVENOUS AS NEEDED
OUTPATIENT
Start: 2025-06-23

## 2025-06-09 RX ORDER — HYDROCORTISONE SODIUM SUCCINATE 100 MG/2ML
100 INJECTION INTRAMUSCULAR; INTRAVENOUS AS NEEDED
OUTPATIENT
Start: 2025-06-23

## 2025-06-09 RX ORDER — ACETAMINOPHEN 325 MG/1
650 TABLET ORAL ONCE
Status: CANCELLED | OUTPATIENT
Start: 2025-06-23

## 2025-06-09 RX ORDER — FAMOTIDINE 20 MG/1
20 TABLET, FILM COATED ORAL ONCE
Status: CANCELLED | OUTPATIENT
Start: 2025-06-23

## 2025-06-09 RX ORDER — DIPHENHYDRAMINE HYDROCHLORIDE 50 MG/ML
50 INJECTION, SOLUTION INTRAMUSCULAR; INTRAVENOUS AS NEEDED
OUTPATIENT
Start: 2025-06-23

## 2025-06-09 RX ORDER — SODIUM CHLORIDE 9 MG/ML
20 INJECTION, SOLUTION INTRAVENOUS ONCE
Status: CANCELLED | OUTPATIENT
Start: 2025-06-23

## 2025-06-09 RX ORDER — CETIRIZINE HYDROCHLORIDE 5 MG/1
5 TABLET ORAL ONCE
Status: CANCELLED | OUTPATIENT
Start: 2025-06-23

## 2025-06-09 RX ORDER — MAGNESIUM GLYCINATE 100 MG
200 CAPSULE ORAL DAILY
COMMUNITY

## 2025-06-09 NOTE — PROGRESS NOTES
[facility]    Date: 06/09/25     Vitals:    06/09/25 1509   BP: 112/70   Pulse: 50   SpO2: 97%    Weight 141.2    Method of arrival: Ambulatory    Weighing self daily: Most days    Monitoring Heart Failure Zones: Most days    Today's HF Zone: Green    Taking medications as prescribed: Yes    Edema Yes abdomen feels bloated    Shortness of Air: Yes with activity    Number of pillows used at night:<2    Educational Materials given:  avs                                                                         ReDS Value: 36  36-41 Possible Hypervolemic Status      Jasmin Vega RN 06/09/25 15:10 EDT

## 2025-06-09 NOTE — PROGRESS NOTES
[facility]  LEONARDO Almaguer Heather Mae, PA  [address] [place] [address],  [place] [id number]    Thank you for asking me to see Suellen Matamoros for congestive heart failure.    HPI:     This is a 84 y.o. female with known past medical history of:    Chronic HFpEF  TTE from 11/22/24 with EF 66-70%, grade 1a diastolic dysfunction.    Atrial fibrillation  YARIEL guided cardioversion in March of 2025  Has EP referral with upcoming July 2025 appointment.   Possible upcoming DCCV in July 2025  Moderate to severe mitral valve regurgitation  Patient discussed with Dr. Thomas with recente YARIEL with it felt she was not a candidate for MitraClip in April 2025.    Essential hypertension  Type 2 DM     Suellen aMtamoros presents for today for Heart Failure evaluation.  The patient is typically seen by Janie Reid PA.  Patient's primary cardiologist is Dr. Asif.     Last known EF 66-70%.   Last known hospitalization and/or ED visit: Hospitalized from 03/06/25 through 03/07/25 with Afib with RVR and acute hypoxemic respiratory failure.   Accompanied by: Son on 1st visit.           04/14/2025 visit data/details regarding:   Dyspnea: Dyspnea with prolonged exertion  Lower extremity swelling: Improving  Abdominal swelling: Denies  Home weight: Weight monitoring booklet provided during initial visit; Has scale  Home BP: BP monitoring booklet provided during initial visit; Has BP cuff. BP log reviewed with SBP in the high 90s-110s.    Home heart rate: HR monitoring booklet provided during initial visit  Daily activities of living: Performing on her own  Pillows/lying flat: 2 pillows in bed   HF zone: Green  Mrs. Matamoros reports she is doing well overall. She is without orthopnea or significant swelling of her extremities. She reports recent YARIEL and possible upcoming cardioversion.           Review of Systems - Review of Systems   Constitutional: Negative for decreased appetite and  diaphoresis.   HENT:  Negative for congestion and ear pain.    Eyes:  Negative for blurred vision and double vision.   Cardiovascular:  Negative for dyspnea on exertion and leg swelling.   Respiratory:  Negative for shortness of breath.    Gastrointestinal:  Negative for bloating and anorexia.   Genitourinary:  Negative for bladder incontinence and dysuria.         All other systems were reviewed and were negative.    Patient Active Problem List   Diagnosis    Borderline hypertension    Premature atrial beats    Abnormal EKG    Persistent atrial fibrillation with a [id number] score of 5    Moderate to severe mitral regurgitation    Chronic heart failure with preserved ejection fraction (HFpEF)    Type 2 diabetes mellitus with hyperglycemia, without long-term current use of insulin    Iron deficiency       family history includes Pulmonary embolism in her mother.     reports that she has never smoked. She has been exposed to tobacco smoke. She has never used smokeless tobacco. She reports that she does not drink alcohol and does not use drugs.    No Known Allergies      Current Outpatient Medications:     ALPHA LIPOIC ACID PO, Take  by mouth As Needed., Disp: , Rfl:     Ascorbic Acid (VITAMIN C PO), Take 250 mg by mouth Daily., Disp: , Rfl:     Calcium Carb-Cholecalciferol (CALCIUM 500+D PO), Take 1 capsule by mouth Daily., Disp: , Rfl:     Cholecalciferol (VITAMIN D-3 PO), Take 400 Units by mouth Daily., Disp: , Rfl:     furosemide (Lasix) 40 MG tablet, Take 1 tablet by mouth Daily., Disp: 90 tablet, Rfl: 1    Magnesium Glycinate 100 MG capsule, Take 200 mg by mouth Daily., Disp: , Rfl:     metFORMIN ER (GLUCOPHAGE-XR) 500 MG 24 hr tablet, Take 1 tablet by mouth Daily With Breakfast., Disp: , Rfl:     metoprolol succinate XL (TOPROL-XL) 25 MG 24 hr tablet, Take 1 tablet by mouth Every 12 (Twelve) Hours. (Patient taking differently: Take 0.5 tablets by mouth Every 12 (Twelve) Hours.), Disp: 90 tablet, Rfl: 2     Multiple Vitamins-Minerals (ICAPS AREDS 2 PO), Take 1 capsule by mouth Daily., Disp: , Rfl:     multivitamin (MULTI VITAMIN PO), Take 1 tablet by mouth Daily., Disp: , Rfl:     multivitamin with minerals (VISION FORMULA PO), Take 1 tablet by mouth Daily., Disp: , Rfl:     Omega-3 Fatty Acids (OMEGA 3 PO), Take 1,000 Units by mouth Daily., Disp: , Rfl:     apixaban (ELIQUIS) 5 MG tablet tablet, Take 1 tablet by mouth 2 (Two) Times a Day., Disp: 180 tablet, Rfl: 4    Barberry-Oreg Grape-Goldenseal (BERBERINE COMPLEX PO), Take 1 capsule by mouth Daily., Disp: , Rfl:       Physical Exam:  I have reviewed the patient's current vital signs as documented in the patient's EMR.   Vitals:    06/09/25 1509   BP: 112/70   Pulse: 50   SpO2: 97%     Body mass index is 25.83 kg/m².       06/09/25  1509   Weight: 64 kg (141 lb 3.2 oz)      Physical Exam  Vitals and nursing note reviewed.   Constitutional:       General: She is awake.      Appearance: Normal appearance. She is well-developed and well-groomed.   HENT:      Head: Normocephalic and atraumatic.   Eyes:      General: Lids are normal.      Comments: Reports ongoing central vision issues with known macular degeneration.    Cardiovascular:      Rate and Rhythm: Normal rate. Rhythm irregularly irregular.      Heart sounds: Murmur heard.   Pulmonary:      Effort: No tachypnea, bradypnea, accessory muscle usage or prolonged expiration.      Breath sounds: No decreased air movement. No decreased breath sounds, wheezing, rhonchi or rales.   Neurological:      Mental Status: She is alert and oriented to person, place, and time.   Psychiatric:         Attention and Perception: Attention normal.         Mood and Affect: Mood normal.         Behavior: Behavior is cooperative.          JVP: Volume/Pulsation: Normal.        DATA REVIEWED:       ---------------------------------------------------  TTE/YARIEL:  Results for orders placed during the hospital encounter of 04/15/25    Adult  Transesophageal Echo 3D (YARIEL) W/ Cont If Necessary Per Protocol    Interpretation Summary    Left ventricular systolic function is normal. Estimated left ventricular EF = 50-55%    Moderate to severe mitral valve regurgitation is present.    Poor coaptation A2-P2. Posterior leaflet length at best is 8 mm, in some views 6 mm. Anterior leaflet cacification at tip. Suboptimal MVA at 3.7 to 4.1 cm2 by 2 D planimetry.    The mitral valve mean gradient is 2 mmHg.    Moderate tricuspid valve regurgitation is present.    Calculated right ventricular systolic pressure from tricuspid regurgitation is 23 mmHg.    Anesthesia: Cath lab/Echo lab moderate sedation    I was present with the patient for the duration of moderate sedation and supervised staff who had no other duties and monitored the patient for the entire procedure.    Name of independent trained observer: Saad Dupree RN  Intra-service start time: 1305  Intra-service end time: 1345        LAST HEART CATH RESULT/IF AVAILABLE:     No results found for this or any previous visit.      -----------------------------------------------------  CXR/Imaging:   Imaging Results (Most Recent)       None            I personally reviewed and interpreted the CXR.      -----------------------------------------------------  CT:   No radiology results for the last 30 days.    I personally reviewed the images of the CT scan.  My personal interpretation is below.      ----------------------------------------------------      --------------------------------------------------------------------------------------------------    Laboratory evaluations:    Lab Results   Component Value Date    GLUCOSE 142 (H) 06/09/2025    BUN 26.8 (H) 06/09/2025    CREATININE 0.64 06/09/2025    BCR 41.9 (H) 06/09/2025    K 4.7 06/09/2025    CO2 20.7 (L) 06/09/2025    CALCIUM 8.9 06/09/2025    ALBUMIN 3.6 03/07/2025    AST 36 (H) 03/07/2025    ALT 42 (H) 03/07/2025     Lab Results   Component Value Date     "WBC 7.17 03/07/2025    HGB 12.0 03/07/2025    HCT 38.7 03/07/2025    MCV 93.5 03/07/2025     03/07/2025     No results found for: \"CHOL\", \"CHLPL\", \"TRIG\", \"HDL\", \"LDL\", \"LDLDIRECT\"  Lab Results   Component Value Date    TSH 1.230 03/06/2025     Lab Results   Component Value Date    HGBA1C 6.80 (H) 03/07/2025     Lab Results   Component Value Date    ALT 42 (H) 03/07/2025     Lab Results   Component Value Date    HGBA1C 6.80 (H) 03/07/2025     Lab Results   Component Value Date    CREATININE 0.64 06/09/2025     Lab Results   Component Value Date    IRON 40 06/09/2025    TIBC 358 06/09/2025    FERRITIN 63.80 06/09/2025     Lab Results   Component Value Date    INR 1.25 (H) 03/06/2025    PROTIME 15.9 (H) 03/06/2025        Lab Results   Component Value Date    ABSOLUTELUNG 36 (A) 06/09/2025    ABSOLUTELUNG 30 04/14/2025    ABSOLUTELUNG 37 (A) 03/17/2025           1. Chronic heart failure with preserved ejection fraction (HFpEF)    2. Persistent atrial fibrillation with a [id number]-[id number] score of 5    3. Iron deficiency    4. Moderate to severe mitral regurgitation          ORDERS PLACED TODAY:  Orders Placed This Encounter   Procedures    ReDs Vest    Basic Metabolic Panel    Iron Profile w/o Ferritin    Magnesium    proBNP    Ferritin    Basic Metabolic Panel    Iron Profile w/o Ferritin    Magnesium    proBNP    Ferritin    Ambulatory Referral to [facility] For Infusion Treatment        Diagnoses and all orders for this visit:    1. Chronic heart failure with preserved ejection fraction (HFpEF) (Primary)  -     Basic Metabolic Panel; Future  -     Iron Profile w/o Ferritin; Future  -     Magnesium; Future  -     proBNP; Future  -     Ferritin; Future  -     Basic Metabolic Panel; Standing  -     Basic Metabolic Panel  -     Iron Profile w/o Ferritin; Standing  -     Iron Profile w/o Ferritin  -     Magnesium; Standing  -     Magnesium  -     proBNP; Standing  -     proBNP  -     Ferritin; Standing  -     Ferritin  -     " Gretel Canela  -     Ambulatory Referral to [facility] For Infusion Treatment    2. Persistent atrial fibrillation with a GDZ6FO2-RISg score of 5    3. Iron deficiency  -     Ambulatory Referral to [facility] For Infusion Treatment    4. Moderate to severe mitral regurgitation    Other orders  -     sodium chloride 0.9 % infusion  -     acetaminophen (TYLENOL) tablet 650 mg  -     cetirizine (zyrTEC) tablet 5 mg  -     famotidine (PEPCID) tablet 20 mg  -     Hydrocortisone Sod Suc (PF) (Solu-CORTEF) injection 100 mg  -     diphenhydrAMINE (BENADRYL) injection 50 mg  -     famotidine (PEPCID) injection 20 mg             MEDS ORDERED TODAY:    No orders of the defined types were placed in this encounter.       ---------------------------------------------------------------------------------------------------------------------------          ASSESSMENT/PLAN:      Diagnosis Plan   1. Chronic heart failure with preserved ejection fraction (HFpEF)  Basic Metabolic Panel    Iron Profile w/o Ferritin    Magnesium    proBNP    Ferritin    Basic Metabolic Panel    Basic Metabolic Panel    Iron Profile w/o Ferritin    Iron Profile w/o Ferritin    Magnesium    Magnesium    proBNP    proBNP    Ferritin    Ferritin    ReDs Vest    Ambulatory Referral to [facility] For Infusion Treatment      2. Persistent atrial fibrillation with a AFD6CV5-WBDq score of 5        3. Iron deficiency  Ambulatory Referral to [facility] For Infusion Treatment      4. Moderate to severe mitral regurgitation            not acutely decompensated chronic diastolic heart failure. CHF.     CHF GOAL DIRECTED MEDICAL THERAPY FOR PATIENT ADDRESSED/ADJUSTED:     GDMT: HFpEF    Drug Class   Drug   Dose Last Dose Adjustment Notes   ACEi/ARB/ARNI Stopped on 03/17/25 visit due to hypotension    Beta Blocker Toprol XL  12.5mg BID      MRA       SGLT2i    N/A   Secondaries if applicable:          -CHF Specific BB:    Metoprolol Succinate  at dose of 25mg qd.  She is taking 12.5mg BID rather than  once daily.    We discussed processes/benefits of HF clinic including nursing, pharmacist, and provider evaluation during each visit with ability for in office ReDS vest, labs, and ability to provide IV diuresis in the clinic with close outpatient monitoring.  Additionally, patient was educated about the availability of delivery of medications to patient's clinic room prior to leaving the building which assists with medication compliance and insures medications are in hands when changes are made (if patient opts for apothecary usage) with thorough guidance regarding changes and medication schedule provided.          -ACE/ARB/ARNi:   Previously stopped Losartan with some improvement in dizziness.      -SGLT2i:   Patient wishes to hold on medication additions at present.     -Diuretic regimen:   ReDS Vest reading for. 06/09/25 is 36; ReDs Vest reading reviewed with patient.    Continue Lasix 40mg qd with patient tolerating well.  Will not adjust diuretics at present with proBNP & weight stable.  Will recheck ReDS on next visit.    BMP, Mag, & ProBNP obtained and reviewed with patient.  ProBNP is WNL. Creatinine is stable.      -Fluid restriction/Sodium restriction:   Requested 2000 ml restriction  Patient has been asked to weigh daily and was provided with a printed diuretic strategy.  1,500 mg Na restriction was discussed.        -Acute and/or Chronic underlying conditions other than HF addressed during visit:   Moderate to Severe Mitral regurgitation:   Continue f/u with [facility].      Essential HTN:   BP well controlled without Losartan.   Continue to keep log books.       Paroxysmal atrial fibrillation:   Continue Toprol XL per [facility].   Continue Eliquis 5mg BID.   Continue f/u with [facility].    Has upcoming appointment with EP as well as possible upcoming cardioversion.     Type 2 DM with Hyperglycemia:   Continue f/u with PCP.      Iron Deficiency:   Discussed iron deficiency being common in heart failure  with iron deficiency negatively impacting functional capacity, quality of life, and life expectancy in patients with heart failure (HF).  Given TSAT of 11% and  ferritin of 63.80 ng/mL,  it is felt Suellen Matamoros will likely benefit from IV iron infusions to assist with iron deficiency in the setting of heart failure.  Recent Hgb level being requested from PCP's office as the last available level here was 13.5 from 05/27 from PCP.    Referral placed to infusion clinic.   Therapy plan completed for iron infusions with Injectafer (Injectafer or Monoferric, if not approved by insurance could try ferumoxytol, iron sucrose or ferric gluconate)     Iron rich diet nutrition education provided.       Identifiable barriers to Heart Failure Self-care:   Medical Barriers: Vision difficulties with macular degeneration  Social Barriers: No immediate known social barriers.     ---------------------------------------------------------              This document has been electronically signed by Toyin Nichole PA-C  June 9, 2025 16:20 EDT      Dictated Utilizing Dragon Dictation: Part of this note may be an electronic transcription/translation of spoken language to printed text using the Dragon Dictation System.    Follow-up appointment and medication changes provided in hand delivered After Visit Summary as well as reviewed in the room.          Barriers to GDMT/dose titration:  Hemodynamic limitations (HR, BP)  RTC: 3 months  Criteria that would warrant earlier follow-up: worsening swelling, worsening shortness of breath, sudden/rapid weight gain   Lab monitoring requirements: Repeat next visit  Patient education provided: AVS & Iron rich dietary info   Self-monitoring instructions:  Daily weights, daily blood pressure monitoring.       Some documentation in this note has been copied forward from a previous note, as the information is still current and accurate.  Text has been changed to reflect changes.          >40  minutes out of 63 minutes face to face spent counseling patient extensively on dietary Na+ intake, importance of activity, weight monitoring, compliance with medications in addition to importance of titration with goal directed medical therapy and follow up appointments. Time was also spent preparing for the visit, reviewing tests, performing a medically appropriate examination and/or evaluation, counseling and educating the patient/family/caregiver, ordering medications, tests, or procedures, referring and communicating with other health care professionals, documenting information in the medical record, independently interpreting results and communicating that information with the patient/family/caregiver, and care coordination

## 2025-06-10 RX ORDER — FAMOTIDINE 20 MG/1
20 TABLET, FILM COATED ORAL ONCE
OUTPATIENT
Start: 2025-06-23

## 2025-06-10 RX ORDER — ACETAMINOPHEN 325 MG/1
650 TABLET ORAL ONCE
OUTPATIENT
Start: 2025-06-23

## 2025-06-10 RX ORDER — SODIUM CHLORIDE 9 MG/ML
20 INJECTION, SOLUTION INTRAVENOUS ONCE
OUTPATIENT
Start: 2025-06-23

## 2025-06-10 RX ORDER — CETIRIZINE HYDROCHLORIDE 10 MG/1
5 TABLET ORAL ONCE
OUTPATIENT
Start: 2025-06-23

## 2025-06-12 ENCOUNTER — DOCUMENTATION (OUTPATIENT)
Dept: CARDIOLOGY | Facility: HOSPITAL | Age: 85
End: 2025-06-12
Payer: MEDICARE

## 2025-06-18 ENCOUNTER — INFUSION (OUTPATIENT)
Dept: ONCOLOGY | Facility: HOSPITAL | Age: 85
End: 2025-06-18
Payer: MEDICARE

## 2025-06-18 VITALS
RESPIRATION RATE: 20 BRPM | DIASTOLIC BLOOD PRESSURE: 86 MMHG | HEART RATE: 84 BPM | TEMPERATURE: 98.4 F | WEIGHT: 140.2 LBS | SYSTOLIC BLOOD PRESSURE: 126 MMHG | OXYGEN SATURATION: 95 % | BODY MASS INDEX: 25.64 KG/M2

## 2025-06-18 DIAGNOSIS — I50.32 CHRONIC HEART FAILURE WITH PRESERVED EJECTION FRACTION (HFPEF): Primary | ICD-10-CM

## 2025-06-18 DIAGNOSIS — E61.1 IRON DEFICIENCY: ICD-10-CM

## 2025-06-18 PROCEDURE — 25010000002 FERRIC CARBOXYMALTOSE 750 MG/15ML SOLUTION 15 ML VIAL: Performed by: PHYSICIAN ASSISTANT

## 2025-06-18 PROCEDURE — 25810000003 SODIUM CHLORIDE 0.9 % SOLUTION: Performed by: PHYSICIAN ASSISTANT

## 2025-06-18 PROCEDURE — 96365 THER/PROPH/DIAG IV INF INIT: CPT

## 2025-06-18 RX ORDER — HYDROCORTISONE SODIUM SUCCINATE 100 MG/2ML
100 INJECTION INTRAMUSCULAR; INTRAVENOUS AS NEEDED
OUTPATIENT
Start: 2025-06-18

## 2025-06-18 RX ORDER — SODIUM CHLORIDE 9 MG/ML
20 INJECTION, SOLUTION INTRAVENOUS ONCE
Status: COMPLETED | OUTPATIENT
Start: 2025-06-18 | End: 2025-06-18

## 2025-06-18 RX ORDER — FAMOTIDINE 20 MG/1
20 TABLET, FILM COATED ORAL ONCE
Status: CANCELLED | OUTPATIENT
Start: 2025-06-18

## 2025-06-18 RX ORDER — CETIRIZINE HYDROCHLORIDE 10 MG/1
5 TABLET ORAL ONCE
Status: COMPLETED | OUTPATIENT
Start: 2025-06-18 | End: 2025-06-18

## 2025-06-18 RX ORDER — ACETAMINOPHEN 325 MG/1
650 TABLET ORAL ONCE
Status: COMPLETED | OUTPATIENT
Start: 2025-06-18 | End: 2025-06-18

## 2025-06-18 RX ORDER — SODIUM CHLORIDE 9 MG/ML
20 INJECTION, SOLUTION INTRAVENOUS ONCE
Status: CANCELLED | OUTPATIENT
Start: 2025-06-18

## 2025-06-18 RX ORDER — FAMOTIDINE 10 MG/ML
20 INJECTION, SOLUTION INTRAVENOUS AS NEEDED
OUTPATIENT
Start: 2025-06-18

## 2025-06-18 RX ORDER — ACETAMINOPHEN 325 MG/1
650 TABLET ORAL ONCE
Status: CANCELLED | OUTPATIENT
Start: 2025-06-18

## 2025-06-18 RX ORDER — DIPHENHYDRAMINE HYDROCHLORIDE 50 MG/ML
50 INJECTION, SOLUTION INTRAMUSCULAR; INTRAVENOUS AS NEEDED
OUTPATIENT
Start: 2025-06-18

## 2025-06-18 RX ORDER — FAMOTIDINE 20 MG/1
20 TABLET, FILM COATED ORAL ONCE
Status: COMPLETED | OUTPATIENT
Start: 2025-06-18 | End: 2025-06-18

## 2025-06-18 RX ORDER — CETIRIZINE HYDROCHLORIDE 10 MG/1
5 TABLET ORAL ONCE
Status: CANCELLED | OUTPATIENT
Start: 2025-06-18

## 2025-06-18 RX ADMIN — ACETAMINOPHEN 650 MG: 325 TABLET ORAL at 16:05

## 2025-06-18 RX ADMIN — CETIRIZINE HYDROCHLORIDE 5 MG: 10 TABLET, FILM COATED ORAL at 16:05

## 2025-06-18 RX ADMIN — SODIUM CHLORIDE 20 ML/HR: 9 INJECTION, SOLUTION INTRAVENOUS at 16:34

## 2025-06-18 RX ADMIN — FAMOTIDINE 20 MG: 20 TABLET, FILM COATED ORAL at 16:05

## 2025-06-18 RX ADMIN — FERRIC CARBOXYMALTOSE INJECTION 1000 MG: 50 INJECTION, SOLUTION INTRAVENOUS at 16:34

## 2025-07-02 ENCOUNTER — HOSPITAL ENCOUNTER (OUTPATIENT)
Dept: CARDIOLOGY | Facility: HOSPITAL | Age: 85
Discharge: HOME | End: 2025-07-02
Payer: MEDICARE

## 2025-07-02 ENCOUNTER — ANESTHESIA EVENT (OUTPATIENT)
Dept: CARDIOLOGY | Facility: HOSPITAL | Age: 85
End: 2025-07-02
Payer: MEDICARE

## 2025-07-02 ENCOUNTER — ANESTHESIA (OUTPATIENT)
Dept: CARDIOLOGY | Facility: HOSPITAL | Age: 85
End: 2025-07-02
Payer: MEDICARE

## 2025-07-02 VITALS
HEIGHT: 62 IN | SYSTOLIC BLOOD PRESSURE: 105 MMHG | DIASTOLIC BLOOD PRESSURE: 62 MMHG | BODY MASS INDEX: 25.76 KG/M2 | OXYGEN SATURATION: 95 % | WEIGHT: 140 LBS | TEMPERATURE: 97.4 F | HEART RATE: 75 BPM | RESPIRATION RATE: 20 BRPM

## 2025-07-02 DIAGNOSIS — I34.0 MODERATE TO SEVERE MITRAL REGURGITATION: ICD-10-CM

## 2025-07-02 DIAGNOSIS — I10 ESSENTIAL HYPERTENSION: ICD-10-CM

## 2025-07-02 DIAGNOSIS — I48.19 PERSISTENT ATRIAL FIBRILLATION: ICD-10-CM

## 2025-07-02 DIAGNOSIS — I50.32 CHRONIC HEART FAILURE WITH PRESERVED EJECTION FRACTION (HFPEF): ICD-10-CM

## 2025-07-02 DIAGNOSIS — E11.65 TYPE 2 DIABETES MELLITUS WITH HYPERGLYCEMIA, WITHOUT LONG-TERM CURRENT USE OF INSULIN: ICD-10-CM

## 2025-07-02 LAB
QT INTERVAL: 438 MS
QTC INTERVAL: 429 MS

## 2025-07-02 PROCEDURE — 25010000002 PROPOFOL 200 MG/20ML EMULSION: Performed by: NURSE ANESTHETIST, CERTIFIED REGISTERED

## 2025-07-02 PROCEDURE — 25810000003 SODIUM CHLORIDE 0.9 % SOLUTION: Performed by: NURSE ANESTHETIST, CERTIFIED REGISTERED

## 2025-07-02 PROCEDURE — 92960 CARDIOVERSION ELECTRIC EXT: CPT

## 2025-07-02 PROCEDURE — 93005 ELECTROCARDIOGRAM TRACING: CPT | Performed by: INTERNAL MEDICINE

## 2025-07-02 RX ORDER — PROPOFOL 10 MG/ML
INJECTION, EMULSION INTRAVENOUS AS NEEDED
Status: DISCONTINUED | OUTPATIENT
Start: 2025-07-02 | End: 2025-07-02 | Stop reason: SURG

## 2025-07-02 RX ORDER — SODIUM CHLORIDE 9 MG/ML
INJECTION, SOLUTION INTRAVENOUS CONTINUOUS PRN
Status: DISCONTINUED | OUTPATIENT
Start: 2025-07-02 | End: 2025-07-02 | Stop reason: SURG

## 2025-07-02 RX ADMIN — PROPOFOL 50 MG: 10 INJECTION, EMULSION INTRAVENOUS at 09:50

## 2025-07-02 RX ADMIN — PROPOFOL 40 MG: 10 INJECTION, EMULSION INTRAVENOUS at 09:52

## 2025-07-02 RX ADMIN — SODIUM CHLORIDE: 9 INJECTION, SOLUTION INTRAVENOUS at 09:48

## 2025-07-02 NOTE — ANESTHESIA PREPROCEDURE EVALUATION
Anesthesia Evaluation     Patient summary reviewed and Nursing notes reviewed   no history of anesthetic complications:   NPO Solid Status: > 8 hours  NPO Liquid Status: > 8 hours           Airway   Mallampati: I  TM distance: >3 FB  Neck ROM: full  No difficulty expected  Dental    (+) edentulous    Pulmonary - normal exam   Cardiovascular     ECG reviewed  PT is on anticoagulation therapy  Patient on routine beta blocker  Rhythm: irregular  Rate: normal    (+) hypertension, valvular problems/murmurs MR, dysrhythmias Atrial Fib, CHF       Neuro/Psych  GI/Hepatic/Renal/Endo    (+) diabetes mellitus type 2 well controlled    Musculoskeletal     Abdominal  - normal exam    Bowel sounds: normal.   Substance History      OB/GYN          Other                    Anesthesia Plan    ASA 3     general   total IV anesthesia  intravenous induction     Anesthetic plan, risks, benefits, and alternatives have been provided, discussed and informed consent has been obtained with: patient.  Pre-procedure education provided  Use of blood products discussed with patient  Consented to blood products.    Plan discussed with attending.    CODE STATUS:

## 2025-07-07 NOTE — ANESTHESIA POSTPROCEDURE EVALUATION
Patient: Suellen Matamoros    Procedure Summary       Date: 07/02/25 Room / Location: [facility]    Anesthesia Start: 0948 Anesthesia Stop: 0957    Procedure: CARDIOVERSION EXTERNAL IN CARDIOLOGY DEPARTMENT Diagnosis:       Persistent atrial fibrillation      Moderate to severe mitral regurgitation      Type 2 diabetes mellitus with hyperglycemia, without long-term current use of insulin      Chronic heart failure with preserved ejection fraction (HFpEF)      Essential hypertension      (Atrial fibrillation)    Scheduled Providers: Sania Dobbs MD Provider: Fidel Fonseca MD    Anesthesia Type: general ASA Status: 3            Anesthesia Type: general    Vitals  Vitals Value Taken Time   /62 07/02/25 10:20   Temp 97.4 °F (36.3 °C) 07/02/25 10:23   Pulse 75 07/02/25 10:20   Resp 20 07/02/25 10:20   SpO2 95 % 07/02/25 10:20           Post Anesthesia Care and Evaluation    Patient location during evaluation: PHASE II  Patient participation: complete - patient participated  Level of consciousness: awake and alert  Pain score: 0  Pain management: adequate    Airway patency: patent  Anesthetic complications: No anesthetic complications    Cardiovascular status: acceptable  Respiratory status: acceptable  Hydration status: acceptable

## 2025-07-21 ENCOUNTER — TELEPHONE (OUTPATIENT)
Dept: CARDIOLOGY | Facility: CLINIC | Age: 85
End: 2025-07-21
Payer: MEDICARE

## 2025-07-22 ENCOUNTER — OFFICE VISIT (OUTPATIENT)
Dept: CARDIOLOGY | Facility: CLINIC | Age: 85
End: 2025-07-22
Payer: MEDICARE

## 2025-07-22 VITALS
DIASTOLIC BLOOD PRESSURE: 72 MMHG | HEIGHT: 62 IN | OXYGEN SATURATION: 96 % | BODY MASS INDEX: 24.77 KG/M2 | HEART RATE: 82 BPM | WEIGHT: 134.6 LBS | SYSTOLIC BLOOD PRESSURE: 114 MMHG

## 2025-07-22 DIAGNOSIS — I34.0 NONRHEUMATIC MITRAL VALVE REGURGITATION: ICD-10-CM

## 2025-07-22 DIAGNOSIS — I48.19 PERSISTENT ATRIAL FIBRILLATION: Primary | ICD-10-CM

## 2025-07-24 NOTE — PROGRESS NOTES
Cardiac Electrophysiology Outpatient Note  [facility] at [facility]    Office Visit     Suellen Matamoros  [id number]  07/22/2025    Primary Care Physician: Janie Reid PA    Referred By: Yo Ibanez PA-C    Subjective     Chief Complaint   Patient presents with    Persistent atrial fibrillation with a [id number] score of    Establish Care       History of Present Illness:   Suellen Matamoros is a 84 y.o. female who presents to my electrophysiology clinic for for evaluation of persistent atrial fibrillation.  Her cardiac issues came to her attention in January after noticing significant shortness of breath.  She was found to have cardiomyopathy with significant mitral regurgitation as well as atrial fibrillation.  They think that they may have been aware of atrial fibrillation going back to November of last year.  However they were uncertain exactly when it started.  She was just hospitalized and diuresed and is overall been feeling quite well.  She is still able to do all her activities.  She denies chest pain, shortness of breath, dyspnea on exertion, palpitations, orthopnea, PND, or lower extremity swelling.        Past Medical History:   Diagnosis Date    Atrial fibrillation     CHF (congestive heart failure)     Diabetes mellitus     Disease of thyroid gland     Elevated liver enzymes     Macular degeneration        Past Surgical History:   Procedure Laterality Date    HERNIA REPAIR      OVARY SURGERY      TONSILLECTOMY         Family History   Problem Relation Age of Onset    Pulmonary embolism Mother        Social History     Socioeconomic History    Marital status:    Tobacco Use    Smoking status: Never     Passive exposure: Past    Smokeless tobacco: Never   Vaping Use    Vaping status: Never Used   Substance and Sexual Activity    Alcohol use: Never    Drug use: Never    Sexual activity: Defer         Current Outpatient Medications:     ALPHA LIPOIC  "ACID PO, Take  by mouth As Needed., Disp: , Rfl:     apixaban (ELIQUIS) 5 MG tablet tablet, Take 1 tablet by mouth 2 (Two) Times a Day., Disp: 180 tablet, Rfl: 4    Ascorbic Acid (VITAMIN C PO), Take 250 mg by mouth Daily., Disp: , Rfl:     Barberry-Oreg Grape-Goldenseal (BERBERINE COMPLEX PO), Take 1 capsule by mouth Daily., Disp: , Rfl:     Cholecalciferol (VITAMIN D-3 PO), Take 400 Units by mouth Daily., Disp: , Rfl:     furosemide (Lasix) 40 MG tablet, Take 1 tablet by mouth Daily., Disp: 90 tablet, Rfl: 1    Magnesium Glycinate 100 MG capsule, Take 200 mg by mouth Daily., Disp: , Rfl:     metFORMIN ER (GLUCOPHAGE-XR) 500 MG 24 hr tablet, Take 1 tablet by mouth Daily With Breakfast., Disp: , Rfl:     metoprolol succinate XL (TOPROL-XL) 25 MG 24 hr tablet, Take 1 tablet by mouth Every 12 (Twelve) Hours. (Patient taking differently: Take 0.5 tablets by mouth Every 12 (Twelve) Hours.), Disp: 90 tablet, Rfl: 2    Multiple Vitamins-Minerals (ICAPS AREDS 2 PO), Take 1 capsule by mouth Daily., Disp: , Rfl:     multivitamin (MULTI VITAMIN PO), Take 1 tablet by mouth Daily., Disp: , Rfl:     multivitamin with minerals (VISION FORMULA PO), Take 1 tablet by mouth Daily., Disp: , Rfl:     Omega-3 Fatty Acids (OMEGA 3 PO), Take 1,000 Units by mouth Daily., Disp: , Rfl:     Calcium Carb-Cholecalciferol (CALCIUM 500+D PO), Take 1 capsule by mouth Daily. (Patient not taking: Reported on 7/21/2025), Disp: , Rfl:     Allergies:   No Known Allergies    Objective   Vital Signs: Blood pressure 114/72, pulse 82, height 157.5 cm (62\"), weight 61.1 kg (134 lb 9.6 oz), SpO2 96%.    PHYSICAL EXAM  General appearance: Awake, alert, cooperative  Head: Normocephalic, without obvious abnormality, atraumatic  Neck: No JVD  Lungs: Clear to ascultation bilaterally  Heart: Irregular rhythm, no murmurs, 2+ LE pulses, no lower extremity swelling  Skin: Skin color, turgor normal, no rashes or lesions  Neurologic: Grossly normal     Lab Results "   Component Value Date    GLUCOSE 142 (H) 06/09/2025    CALCIUM 8.9 06/09/2025     06/09/2025    K 4.7 06/09/2025    CO2 20.7 (L) 06/09/2025     06/09/2025    BUN 26.8 (H) 06/09/2025    CREATININE 0.64 06/09/2025    BCR 41.9 (H) 06/09/2025    ANIONGAP 12.3 06/09/2025     Lab Results   Component Value Date    WBC 7.17 03/07/2025    HGB 12.0 03/07/2025    HCT 38.7 03/07/2025    MCV 93.5 03/07/2025     03/07/2025     Lab Results   Component Value Date    INR 1.25 (H) 03/06/2025    PROTIME 15.9 (H) 03/06/2025     Lab Results   Component Value Date    TSH 1.230 03/06/2025          Results for orders placed during the hospital encounter of 04/15/25    Adult Transesophageal Echo 3D (YARIEL) W/ Cont If Necessary Per Protocol 04/17/2025  2:43 PM    Interpretation Summary    Left ventricular systolic function is normal. Estimated left ventricular EF = 50-55%    Moderate to severe mitral valve regurgitation is present.    Poor coaptation A2-P2. Posterior leaflet length at best is 8 mm, in some views 6 mm. Anterior leaflet cacification at tip. Suboptimal MVA at 3.7 to 4.1 cm2 by 2 D planimetry.    The mitral valve mean gradient is 2 mmHg.    Moderate tricuspid valve regurgitation is present.    Calculated right ventricular systolic pressure from tricuspid regurgitation is 23 mmHg.    Anesthesia: Cath lab/Echo lab moderate sedation    I was present with the patient for the duration of moderate sedation and supervised staff who had no other duties and monitored the patient for the entire procedure.    Name of independent trained observer: Saad Dupree RN  Intra-service start time: 1305  Intra-service end time: 1345         I personally viewed and interpreted the patient's EKG/Telemetry/lab data    Procedures    EKG reviewed, shows atrial fibrillation with controlled ventricular rate.  Reviewed prior notes from Dr. Thomas.  Reviewed transesophageal echo notes.    Suellen HDZ Shiloh  reports that she has  never smoked. She has been exposed to tobacco smoke. She has never used smokeless tobacco. I    Advance Care Planning   Advance Care Planning: ACP discussion was held with the patient during this visit. Patient does not have an advance directive, information provided.     Assessment & Plan    1. Persistent atrial fibrillation with a [id number] score of 5  Patient has history of at least persistent, possibly longstanding persistent, atrial fibrillation.  Overall she seems to be quite well rate controlled.  She is not having any major symptoms at the current time.  She is referred for consideration of additional treatment of this.    We discussed the pathophysiology of atrial fibrillation as well as treatment options going forward.  This included discussion of antiarrhythmic therapy as well as catheter ablation.  For the latter we discussed how the procedures performed including the likely of success and potential risks.      Overall, given the longstanding nature of her atrial fibrillation, age, and minimal symptoms, I do not feel she would be a great candidate for an ablation procedure.  We also discussed the option of Tikosyn, however again I am not certain of the likelihood that this would keep her in rhythm.  Their main question was whether or not staying in atrial fibrillation would potentially worsen her health in the future.  Overall, as long as she remains on Eliquis for stroke prevention and her rate remains controlled I think the risk of decompensation from atrial fibrillation remains quite low.  We also discussed the option of AV node ablation and pacemaker plantation, however at the current time as she is well rate controlled, I am uncertain this would provide much symptomatic benefit.    2. Nonrheumatic mitral valve regurgitation  She does have at least moderate to severe mitral regurgitation.  This is likely a big  in her prior symptoms.  She was seen by Dr. Thomas and not felt to be a  good candidate for MitraClip.  I discussed her case with Dr. Thomas and if her mitral regurgitation is felt to be a significant issue then we could refer her to [facility] for consideration of other surgical versus nonsurgical treatment for her mitral regurgitation.  Will discuss this with her primary cardiology team.    Follow Up:  Return in about 3 months (around 10/22/2025) for With Nathan.      Thank you for allowing me to participate in the care of your patient. Please do not hesitate to contact me with additional questions or concerns.      Dilshad Juarez M.D.  Cardiac Electrophysiologist  [facility] / [facility]

## 2025-07-28 ENCOUNTER — OFFICE VISIT (OUTPATIENT)
Dept: CARDIOLOGY | Facility: CLINIC | Age: 85
End: 2025-07-28
Payer: MEDICARE

## 2025-07-28 VITALS
HEART RATE: 83 BPM | BODY MASS INDEX: 25.51 KG/M2 | DIASTOLIC BLOOD PRESSURE: 77 MMHG | HEIGHT: 62 IN | WEIGHT: 138.6 LBS | OXYGEN SATURATION: 96 % | SYSTOLIC BLOOD PRESSURE: 112 MMHG

## 2025-07-28 DIAGNOSIS — I34.0 MODERATE TO SEVERE MITRAL REGURGITATION: ICD-10-CM

## 2025-07-28 DIAGNOSIS — I50.32 CHRONIC HEART FAILURE WITH PRESERVED EJECTION FRACTION (HFPEF): ICD-10-CM

## 2025-07-28 DIAGNOSIS — I48.19 PERSISTENT ATRIAL FIBRILLATION: Primary | ICD-10-CM

## 2025-07-29 NOTE — PROGRESS NOTES
Janie Reid PA  Suellen Matamoros  1940 07/28/2025    Patient Active Problem List   Diagnosis    Borderline hypertension    Premature atrial beats    Abnormal EKG    Persistent atrial fibrillation with a [id number] score of 5    Moderate to severe mitral regurgitation    Chronic heart failure with preserved ejection fraction (HFpEF)    Type 2 diabetes mellitus with hyperglycemia, without long-term current use of insulin    Iron deficiency       Dear Janie Reid PA:    Subjective     History of Present Illness:    Chief Complaint   Patient presents with    Follow-up     routine       Suellen Matamoros is a pleasant 84 y.o. female with a past medical history significant for persistent atrial fibrillation, chronic HFpEF, moderate to severe mitral valve regurgitation, diabetes mellitus, essential hypertension.  She comes in today for cardiology follow-up.    Genia comes in today after following with the EP who felt ablation were not benefit her much as she has been relatively asymptomatic and unaware of underlying arrhythmia.  Clinically today she reports she still doing well and is able to form her ADLs without any major limitations and is breathing better.  She denies any chest pains, dizziness, or syncope.  Both her and her family who is with her today still slight concerns for options going forward for her underlying mitral valve regurgitation      No Known Allergies:      Current Outpatient Medications:     ALPHA LIPOIC ACID PO, Take  by mouth As Needed., Disp: , Rfl:     apixaban (ELIQUIS) 5 MG tablet tablet, Take 1 tablet by mouth 2 (Two) Times a Day., Disp: 180 tablet, Rfl: 4    Ascorbic Acid (VITAMIN C PO), Take 250 mg by mouth Daily., Disp: , Rfl:     Barberry-Oreg Grape-Goldenseal (BERBERINE COMPLEX PO), Take 1 capsule by mouth Daily., Disp: , Rfl:     Calcium Carb-Cholecalciferol (CALCIUM 500+D PO), Take 1 capsule by mouth Daily., Disp: , Rfl:     Cholecalciferol (VITAMIN D-3 PO),  "Take 400 Units by mouth Daily., Disp: , Rfl:     furosemide (Lasix) 40 MG tablet, Take 1 tablet by mouth Daily., Disp: 90 tablet, Rfl: 1    Magnesium Glycinate 100 MG capsule, Take 200 mg by mouth Daily., Disp: , Rfl:     metFORMIN ER (GLUCOPHAGE-XR) 500 MG 24 hr tablet, Take 1 tablet by mouth Daily With Breakfast., Disp: , Rfl:     metoprolol succinate XL (TOPROL-XL) 25 MG 24 hr tablet, Take 1 tablet by mouth Every 12 (Twelve) Hours. (Patient taking differently: Take 0.5 tablets by mouth Every 12 (Twelve) Hours.), Disp: 90 tablet, Rfl: 2    Multiple Vitamins-Minerals (ICAPS AREDS 2 PO), Take 1 capsule by mouth Daily., Disp: , Rfl:     multivitamin (MULTI VITAMIN PO), Take 1 tablet by mouth Daily., Disp: , Rfl:     multivitamin with minerals (VISION FORMULA PO), Take 1 tablet by mouth Daily., Disp: , Rfl:     Omega-3 Fatty Acids (OMEGA 3 PO), Take 1,000 Units by mouth Daily., Disp: , Rfl:     The following portions of the patient's history were reviewed and updated as appropriate: allergies, current medications, past family history, past medical history, past social history, past surgical history and problem list.    Social History     Tobacco Use    Smoking status: Never     Passive exposure: Past    Smokeless tobacco: Never   Vaping Use    Vaping status: Never Used   Substance Use Topics    Alcohol use: Never    Drug use: Never         Objective   Vitals:    07/28/25 1518   BP: 112/77   Pulse: 83   SpO2: 96%   Weight: 62.9 kg (138 lb 9.6 oz)   Height: 157.5 cm (62\")     Body mass index is 25.35 kg/m².    ROS    Constitutional:       General: Not in acute distress.     Appearance: Healthy appearance. Well-developed and not in distress. Not diaphoretic.   Eyes:      Conjunctiva/sclera: Conjunctivae normal.      Pupils: Pupils are equal, round, and reactive to light.   HENT:      Head: Normocephalic and atraumatic.   Neck:      Vascular: No carotid bruit or JVD.   Pulmonary:      Effort: Pulmonary effort is normal. " "No respiratory distress.      Breath sounds: Normal breath sounds.   Cardiovascular:      Normal rate. Irregularly irregular rhythm.      Murmurs: There is a systolic murmur.   Edema:     Peripheral edema absent.   Skin:     General: Skin is cool.   Neurological:      Mental Status: Alert, oriented to person, place, and time and oriented to person, place and time.         Lab Results   Component Value Date     06/09/2025    K 4.7 06/09/2025     06/09/2025    CO2 20.7 (L) 06/09/2025    BUN 26.8 (H) 06/09/2025    CREATININE 0.64 06/09/2025    GLUCOSE 142 (H) 06/09/2025    CALCIUM 8.9 06/09/2025    AST 36 (H) 03/07/2025    ALT 42 (H) 03/07/2025    ALKPHOS 76 03/07/2025     No results found for: \"CKTOTAL\"  Lab Results   Component Value Date    WBC 7.17 03/07/2025    HGB 12.0 03/07/2025    HCT 38.7 03/07/2025     03/07/2025     Lab Results   Component Value Date    INR 1.25 (H) 03/06/2025     Lab Results   Component Value Date    MG 2.1 06/09/2025     Lab Results   Component Value Date    TSH 1.230 03/06/2025      No results found for: \"BNP\"    During this visit the following were done:  Labs Reviewed []    Labs Ordered []    Radiology Reports Reviewed []    Radiology Ordered []    PCP Records Reviewed []    Referring Provider Records Reviewed []    ER Records Reviewed []    Hospital Records Reviewed []    History Obtained From Family []    Radiology Images Reviewed []    Other Reviewed []    Records Requested []       Procedures    Assessment & Plan    Diagnosis Plan   1. Persistent atrial fibrillation with a [id number] score of 5  Iron Profile    Comprehensive Metabolic Panel      2. Chronic heart failure with preserved ejection fraction (HFpEF)  Iron Profile    Comprehensive Metabolic Panel      3. Moderate to severe mitral regurgitation  Iron Profile    Comprehensive Metabolic Panel               Recommendations:  Persistent atrial fibrillation  Doing well clinically no awareness of arrhythmia.  " Continue metoprolol and Eliquis.  Both her and her family who is with her still are very concerned for her atrial fibrillation and I had a very long discussion roughly 20 minutes on options for her a underlying atrial fibrillation and given her asymptomatic I do think it is best to continue with rate control and anticoagulation.  I did offer referral to a different cardiology group for second opinion and they declined  Moderate to severe mitral valve regurgitation  This was both the patient and her family's major concern today and spent significant portion of the visit just discussing this.  It is already been evaluated by interventional cardiology/CT surgery at [facility] and did not feel that she was a good surgical candidate due to the anatomy of her valve.  Unfortunately I am unable to provide further info with this and could only refer her for second opinions I did offer referring her to the [facility], [facility], [facility], or even [facility].      Ultimately both her and her family seemed satisfied with our discussion and was okay with continuing her care here and to hold off on further referrals at this time.  Which I certainly think is a good option considering her high quality of life and currently being asymptomatic.    No follow-ups on file.    As always, I appreciate very much the opportunity to participate in the cardiovascular care of your patients.      With Best Regards,    Yo Ibanez PA-C

## 2025-08-15 ENCOUNTER — TELEPHONE (OUTPATIENT)
Dept: CARDIOLOGY | Facility: CLINIC | Age: 85
End: 2025-08-15
Payer: MEDICARE